# Patient Record
Sex: MALE | Race: WHITE | NOT HISPANIC OR LATINO | Employment: FULL TIME | URBAN - METROPOLITAN AREA
[De-identification: names, ages, dates, MRNs, and addresses within clinical notes are randomized per-mention and may not be internally consistent; named-entity substitution may affect disease eponyms.]

---

## 2017-06-28 ENCOUNTER — DOCUMENTATION ONLY (OUTPATIENT)
Dept: SLEEP MEDICINE | Facility: CLINIC | Age: 47
End: 2017-06-28

## 2017-10-04 DIAGNOSIS — I10 ESSENTIAL HYPERTENSION WITH GOAL BLOOD PRESSURE LESS THAN 140/90: ICD-10-CM

## 2017-10-04 NOTE — TELEPHONE ENCOUNTER
Lisinopril      Last Written Prescription Date: 09/20/2016  Last Fill Quantity: 90, # refills: 3  Last Office Visit with G, P or [facility] prescribing provider: 09/20/2016       Potassium   Date Value Ref Range Status   09/20/2016 4.1 3.4 - 5.3 mmol/L Final     Creatinine   Date Value Ref Range Status   09/20/2016 1.07 0.66 - 1.25 mg/dL Final     BP Readings from Last 3 Encounters:   09/20/16 122/86   06/29/16 122/77   08/17/15 130/84     Bijan HALL (R)

## 2017-10-05 RX ORDER — LISINOPRIL 10 MG/1
10 TABLET ORAL DAILY
Qty: 30 TABLET | Refills: 0 | Status: SHIPPED | OUTPATIENT
Start: 2017-10-05 | End: 2017-11-27

## 2017-11-27 ENCOUNTER — OFFICE VISIT (OUTPATIENT)
Dept: FAMILY MEDICINE | Facility: CLINIC | Age: 47
End: 2017-11-27
Payer: COMMERCIAL

## 2017-11-27 VITALS
BODY MASS INDEX: 30.59 KG/M2 | HEIGHT: 75 IN | TEMPERATURE: 97 F | HEART RATE: 64 BPM | WEIGHT: 246 LBS | SYSTOLIC BLOOD PRESSURE: 133 MMHG | DIASTOLIC BLOOD PRESSURE: 82 MMHG

## 2017-11-27 DIAGNOSIS — I10 ESSENTIAL HYPERTENSION WITH GOAL BLOOD PRESSURE LESS THAN 140/90: ICD-10-CM

## 2017-11-27 PROCEDURE — 99213 OFFICE O/P EST LOW 20 MIN: CPT | Performed by: NURSE PRACTITIONER

## 2017-11-27 RX ORDER — LISINOPRIL 10 MG/1
10 TABLET ORAL DAILY
Qty: 90 TABLET | Refills: 3 | Status: SHIPPED | OUTPATIENT
Start: 2017-11-27 | End: 2018-12-22

## 2017-11-27 NOTE — MR AVS SNAPSHOT
After Visit Summary   11/27/2017    Calista Garnica    MRN: [id number]           Patient Information     Date Of Birth          1970        Visit Information        Provider Department      11/27/2017 11:20 AM Carli Molnia APRN [facility]        Today's Diagnoses     Essential hypertension with goal blood pressure less than 140/90          Care Instructions    Results for CALISTA GARNICA (MRN [id number]) as of 11/27/2017 11:51   Ref. Range 6/29/2015 10:19 8/17/2015 10:22 8/17/2015 10:23 9/20/2016 11:17   Sodium Latest Ref Range: 133 - 144 mmol/L 140   139   Potassium Latest Ref Range: 3.4 - 5.3 mmol/L 4.3   4.1   Chloride Latest Ref Range: 94 - 109 mmol/L 107   105   Carbon Dioxide Latest Ref Range: 20 - 32 mmol/L 27   27   Urea Nitrogen Latest Ref Range: 7 - 30 mg/dL 21   19   Creatinine Latest Ref Range: 0.66 - 1.25 mg/dL 1.14   1.07   GFR Estimate Latest Ref Range: >60 mL/min/1.7m2 69   74   GFR Estimate If Black Latest Ref Range: >60 mL/min/1.7m2 84   89   Calcium Latest Ref Range: 8.5 - 10.1 mg/dL 8.8   9.0   Anion Gap Latest Ref Range: 3 - 14 mmol/L 6   7   Hemoglobin A1C Latest Ref Range: 4.3 - 6.0 %  5.7  5.3   Cholesterol Latest Ref Range: <200 mg/dL 145   180   Cholesterol/HDL Ratio Latest Ref Range: 0.0 - 5.0  5.0      HDL Cholesterol Latest Ref Range: >39 mg/dL 29 (L)   33 (L)   LDL Cholesterol Calculated Latest Ref Range: <100 mg/dL 80   109 (H)   Non HDL Cholesterol Latest Ref Range: <130 mg/dL    147 (H)   Triglycerides Latest Ref Range: <150 mg/dL 178 (H)   189 (H)   VLDL-Cholesterol Latest Ref Range: 0 - 30 mg/dL 36 (H)      Glucose Latest Ref Range: 70 - 99 mg/dL 103 (H) 116 (H)  103 (H)     Please schedule lab appointment to check kidney function, glucose and electrolytes      1. Try to follow low fat, or Mediterranean diet:   -a Mediterranean diet appears to reduce the risk of cardiovascular events. There is no single Mediterranean diet, but such  "diets are typically high in fruits, vegetables, whole grains, beans, nuts, and seeds and include olive oil as an important source of fat; there are typically low to moderate amounts of fish, poultry, and dairy products, and there is little red meat.  2. Exercise daily.   3. Add fiber supplement, certain soluble fibers, such as Metamucil, Psyllium will help to reduce LDL, or \"bad cholesterol\".   4. Eating nuts (Walnuts, hazelnuts and almonds can also help to lower triglycerides and LDL    5. Fish oil and omega 3 fatty acids can reduce triglycerides.               Follow-ups after your visit        Future tests that were ordered for you today     Open Future Orders        Priority Expected Expires Ordered    **Basic metabolic panel FUTURE anytime Routine 11/27/2017 11/27/2018 11/27/2017            Who to contact     If you have questions or need follow up information about today's clinic visit or your schedule please contact [facility] directly at [phone].  Normal or non-critical lab and imaging results will be communicated to you by [employer], letter or phone within 4 business days after the clinic has received the results. If you do not hear from us within 7 days, please contact the clinic through [employer] or phone. If you have a critical or abnormal lab result, we will notify you by phone as soon as possible.  Submit refill requests through [employer] or call your pharmacy and they will forward the refill request to us. Please allow 3 business days for your refill to be completed.          Additional Information About Your Visit        [employer] Information     [employer] lets you send messages to your doctor, view your test results, renew your prescriptions, schedule appointments and more. To sign up, go to [email or web address] . Click on \"Log in\" on the left side of the screen, which will take you to the Welcome page. Then click on \"Sign up Now\" on the right side of the page.     You will be asked to " "enter the access code listed below, as well as some personal information. Please follow the directions to create your username and password.     Your access code is: [email or web address]  Expires: 2018 12:01 PM     Your access code will  in 90 days. If you need help or a new code, please call your [facility] clinic or [phone].        Care EveryWhere ID     This is your Care EveryWhere ID. This could be used by other organizations to access your [facility] medical records  [id number]        Your Vitals Were     Pulse Temperature Height BMI (Body Mass Index)          64 97  F (36.1  C) (Tympanic) 6' 2.5\" (1.892 m) 31.16 kg/m2         Blood Pressure from Last 3 Encounters:   17 133/82   16 122/86   16 122/77    Weight from Last 3 Encounters:   17 246 lb (111.6 kg)   16 240 lb 6.4 oz (109 kg)   16 242 lb (109.8 kg)                 Where to get your medicines      These medications were sent to [facility] [id number] [place] [address] - [address] AT [facility]  [address], [place] [address]     Phone:  [phone]     lisinopril 10 MG tablet          Primary Care Provider Office Phone # Fax #    Evert Zavaleta [phone] [address] [facility] [address]        Equal Access to Services     [facility]: Hadii aad ku hadasho Soomaali, waaxda luqadaha, qaybta kaalmada brayan, osmany idiin hayaan adeeg kharash la'aan . So [facility] [phone].    ATENCIÓN: Si habla español, tiene a lu disposición servicios gratuitos de asistencia lingüística. Llame al [phone].    We comply with applicable federal civil rights laws and [place] laws. We do not discriminate on the basis of race, color, national origin, age, disability, sex, sexual orientation, or gender identity.            Thank you!     Thank you for choosing [facility]  for your care. Our goal is always to provide you with excellent care. " Hearing back from our patients is one way we can continue to improve our services. Please take a few minutes to complete the written survey that you may receive in the mail after your visit with us. Thank you!             Your Updated Medication List - Protect others around you: Learn how to safely use, store and throw away your medicines at www.disposemymeds.org.          This list is accurate as of: 11/27/17 12:01 PM.  Always use your most recent med list.                   Brand Name Dispense Instructions for use Diagnosis    lisinopril 10 MG tablet    PRINIVIL/ZESTRIL    90 tablet    Take 1 tablet (10 mg) by mouth daily (Needs follow-up appointment for this medication)    Essential hypertension with goal blood pressure less than 140/90       order for DME      Elpidio was set up on [employer] Pressure: set 7 Mask of choice: Wisp Size: Large cushion  Follow up appointment is scheduled: April 13, 2015

## 2017-11-27 NOTE — NURSING NOTE
"Chief Complaint   Patient presents with     Refill Request     pending        Initial /82  Pulse 64  Temp 97  F (36.1  C) (Tympanic)  Ht 6' 2.5\" (1.892 m)  Wt 246 lb (111.6 kg)  BMI 31.16 kg/m2 Estimated body mass index is 31.16 kg/(m^2) as calculated from the following:    Height as of this encounter: 6' 2.5\" (1.892 m).    Weight as of this encounter: 246 lb (111.6 kg).  Medication Reconciliation: complete  "

## 2017-11-27 NOTE — PROGRESS NOTES
"  SUBJECTIVE:   Elpidio Swift is a 47 year old male who presents to clinic today for the following health issues:      Hypertension Follow-up      Outpatient blood pressures are being checked at home.  Results are 132/77.    Low Salt Diet: not monitoring salt    Amount of exercise or physical activity: 6-7 days/week for an average of 30-45 minutes    Problems taking medications regularly: No    Medication side effects: none    Diet: regular (no restrictions)    HYPERTENSION - Patient has longstanding history of mod-severe HTN , currently denies any symptoms referable to elevated blood pressure. Specifically denies chest pain, palpitations, dyspnea, orthopnea, PND or peripheral edema. Blood pressure readings have been in fair range. Current medication regimen is as listed below. Patient denies any side effects of medication.      Problem list and histories reviewed & adjusted, as indicated.  Additional history: as documented    Labs reviewed in EPIC    Reviewed and updated as needed this visit by clinical staffTobacco  Allergies  Med Hx  Surg Hx  Fam Hx  Soc Hx      Reviewed and updated as needed this visit by Provider         ROS:  Constitutional, HEENT, cardiovascular, pulmonary, gi and gu systems are negative, except as otherwise noted.      OBJECTIVE:   /82  Pulse 64  Temp 97  F (36.1  C) (Tympanic)  Ht 6' 2.5\" (1.892 m)  Wt 246 lb (111.6 kg)  BMI 31.16 kg/m2  Body mass index is 31.16 kg/(m^2).  GENERAL: healthy, alert and no distress  CV: regular rate and rhythm, normal S1 S2, no S3 or S4, no murmur, click or rub, no peripheral edema and peripheral pulses strong  MS: no gross musculoskeletal defects noted, no edema  PSYCH: mentation appears normal, affect normal/bright    Diagnostic Test Results:  pending    ASSESSMENT/PLAN:     1. Essential hypertension with goal blood pressure less than 140/90  -well controlled, refill provided   - lisinopril (PRINIVIL/ZESTRIL) 10 MG tablet; Take 1 tablet (10 " mg) by mouth daily (Needs follow-up appointment for this medication)  Dispense: 90 tablet; Refill: 3  - Basic metabolic panel FUTURE anytime; Future  - Lipid panel reflex to direct LDL Fasting; Future    See Patient Instructions    AZAR Dasilva [facility]

## 2017-11-27 NOTE — PATIENT INSTRUCTIONS
"Results for CALISTA GARNICA (MRN [id number]) as of 11/27/2017 11:51   Ref. Range 6/29/2015 10:19 8/17/2015 10:22 8/17/2015 10:23 9/20/2016 11:17   Sodium Latest Ref Range: 133 - 144 mmol/L 140   139   Potassium Latest Ref Range: 3.4 - 5.3 mmol/L 4.3   4.1   Chloride Latest Ref Range: 94 - 109 mmol/L 107   105   Carbon Dioxide Latest Ref Range: 20 - 32 mmol/L 27   27   Urea Nitrogen Latest Ref Range: 7 - 30 mg/dL 21   19   Creatinine Latest Ref Range: 0.66 - 1.25 mg/dL 1.14   1.07   GFR Estimate Latest Ref Range: >60 mL/min/1.7m2 69   74   GFR Estimate If Black Latest Ref Range: >60 mL/min/1.7m2 84   89   Calcium Latest Ref Range: 8.5 - 10.1 mg/dL 8.8   9.0   Anion Gap Latest Ref Range: 3 - 14 mmol/L 6   7   Hemoglobin A1C Latest Ref Range: 4.3 - 6.0 %  5.7  5.3   Cholesterol Latest Ref Range: <200 mg/dL 145   180   Cholesterol/HDL Ratio Latest Ref Range: 0.0 - 5.0  5.0      HDL Cholesterol Latest Ref Range: >39 mg/dL 29 (L)   33 (L)   LDL Cholesterol Calculated Latest Ref Range: <100 mg/dL 80   109 (H)   Non HDL Cholesterol Latest Ref Range: <130 mg/dL    147 (H)   Triglycerides Latest Ref Range: <150 mg/dL 178 (H)   189 (H)   VLDL-Cholesterol Latest Ref Range: 0 - 30 mg/dL 36 (H)      Glucose Latest Ref Range: 70 - 99 mg/dL 103 (H) 116 (H)  103 (H)     Please schedule lab appointment to check kidney function, glucose and electrolytes      1. Try to follow low fat, or Mediterranean diet:   -a Mediterranean diet appears to reduce the risk of cardiovascular events. There is no single Mediterranean diet, but such diets are typically high in fruits, vegetables, whole grains, beans, nuts, and seeds and include olive oil as an important source of fat; there are typically low to moderate amounts of fish, poultry, and dairy products, and there is little red meat.  2. Exercise daily.   3. Add fiber supplement, certain soluble fibers, such as Metamucil, Psyllium will help to reduce LDL, or \"bad cholesterol\".   4. Eating nuts " (Walnuts, hazelnuts and almonds can also help to lower triglycerides and LDL    5. Fish oil and omega 3 fatty acids can reduce triglycerides.

## 2018-02-15 ENCOUNTER — TELEPHONE (OUTPATIENT)
Dept: FAMILY MEDICINE | Facility: CLINIC | Age: 48
End: 2018-02-15

## 2018-02-15 NOTE — TELEPHONE ENCOUNTER
Reason for Call:  Other stomach flu vs food poisoning    Detailed comments: Patient is not sure if he had stomach flu or food poisoning.    Phone Number Patient can be reached at: Cell number on file:    Telephone Information:   Mobile [phone]       Best Time: any    Can we leave a detailed message on this number? YES    Call taken on 2/15/2018 at 10:14 AM by Angelica Kumar

## 2018-02-15 NOTE — TELEPHONE ENCOUNTER
"\"diarrhea went away now, but  Sunday night about 10 pm, went to [employer], ate steak and cheese with all the vegetables, and about an hour later I had abd pain, and diarrhea really bad, and then I vomited. 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 \"  \"diarrhea continued Monday, and I slept off and on , and never threw up again and then diarrhea continued into Tuesday evening. I was drinking water and gatorade, and had crackers , and tried to go to work yesterday and my stomach is still rolling, but the diarrhea has gone away. \"  Temp normal, able to drink. Feeling better.   Doing better had questions about how long this might last, was it food or viral gastroenteritis, anything that he needs to do.   Reviewed and when to be seen, he understood and agreed.    FOOD POISONING or VIRAL GASTROENTERITIS (6yr-Adult) from references reviewed with him  Jessica Solis RNC          "

## 2018-03-08 DIAGNOSIS — I10 ESSENTIAL HYPERTENSION WITH GOAL BLOOD PRESSURE LESS THAN 140/90: ICD-10-CM

## 2018-03-08 LAB
ANION GAP SERPL CALCULATED.3IONS-SCNC: 6 MMOL/L (ref 3–14)
BUN SERPL-MCNC: 14 MG/DL (ref 7–30)
CALCIUM SERPL-MCNC: 8.7 MG/DL (ref 8.5–10.1)
CHLORIDE SERPL-SCNC: 103 MMOL/L (ref 94–109)
CHOLEST SERPL-MCNC: 147 MG/DL
CO2 SERPL-SCNC: 28 MMOL/L (ref 20–32)
CREAT SERPL-MCNC: 1.09 MG/DL (ref 0.66–1.25)
GFR SERPL CREATININE-BSD FRML MDRD: 72 ML/MIN/1.7M2
GLUCOSE SERPL-MCNC: 84 MG/DL (ref 70–99)
HDLC SERPL-MCNC: 27 MG/DL
LDLC SERPL CALC-MCNC: 88 MG/DL
NONHDLC SERPL-MCNC: 120 MG/DL
POTASSIUM SERPL-SCNC: 4.5 MMOL/L (ref 3.4–5.3)
SODIUM SERPL-SCNC: 137 MMOL/L (ref 133–144)
TRIGL SERPL-MCNC: 160 MG/DL

## 2018-03-08 PROCEDURE — 80048 BASIC METABOLIC PNL TOTAL CA: CPT | Performed by: NURSE PRACTITIONER

## 2018-03-08 PROCEDURE — 80061 LIPID PANEL: CPT | Performed by: NURSE PRACTITIONER

## 2018-03-08 PROCEDURE — 36415 COLL VENOUS BLD VENIPUNCTURE: CPT | Performed by: NURSE PRACTITIONER

## 2018-06-13 DIAGNOSIS — G47.33 OSA (OBSTRUCTIVE SLEEP APNEA): Primary | Chronic | ICD-10-CM

## 2018-06-27 ENCOUNTER — OFFICE VISIT (OUTPATIENT)
Dept: SLEEP MEDICINE | Facility: CLINIC | Age: 48
End: 2018-06-27
Payer: COMMERCIAL

## 2018-06-27 VITALS
HEIGHT: 75 IN | SYSTOLIC BLOOD PRESSURE: 119 MMHG | DIASTOLIC BLOOD PRESSURE: 78 MMHG | BODY MASS INDEX: 29.22 KG/M2 | OXYGEN SATURATION: 94 % | HEART RATE: 60 BPM | WEIGHT: 235 LBS

## 2018-06-27 DIAGNOSIS — G47.33 OSA (OBSTRUCTIVE SLEEP APNEA): Primary | ICD-10-CM

## 2018-06-27 PROCEDURE — 99214 OFFICE O/P EST MOD 30 MIN: CPT | Performed by: PHYSICIAN ASSISTANT

## 2018-06-27 NOTE — NURSING NOTE
"Chief Complaint   Patient presents with     CPAP Follow Up     Routine follow up for DOT . Needs letter and download       Initial /78  Pulse 60  Ht 1.892 m (6' 2.5\")  Wt 106.6 kg (235 lb)  SpO2 94%  BMI 29.77 kg/m2 Estimated body mass index is 29.77 kg/(m^2) as calculated from the following:    Height as of this encounter: 1.892 m (6' 2.5\").    Weight as of this encounter: 106.6 kg (235 lb).    Medication Reconciliation: complete      "

## 2018-06-27 NOTE — PROGRESS NOTES
Obstructive Sleep Apnea - PAP Follow-Up Visit:    Chief Complaint   Patient presents with     CPAP Follow Up     Routine follow up for DOT . Needs letter and download       Elpidio Swift comes in today for CPAP compliance follow-up of severe ROSALINA (2/26/2015 with AHI 85.2, xin desat 75%, CPAP 7-8 optimal) treated with CPAP 7 cm H2O.  Presenting concerns of snoring, observed apnea, HTN, DOT CDL renewal.    Overall, the patient rates his experience with PAP as 10 (0 poor, 10 great). The mask is comfortable. The mask is not leaking.  He is not snoring with the mask on. He is not having gasp arousals. He is not having any oral or nasal dryness. The pressure settings are comfortable.    His PAP interface is Nasal Pillows.    Bedtime is typically 2 AM. Usually it takes about 10 AM minutes to fall asleep with the mask on. Wake time is typically 11 AM.  Patient is using PAP therapy 7 hours per night. The patient is usually getting 7 hours of sleep per night.    He does feel rested in the morning.    [place] Sleepiness Scale: 0/24    Respironics  CPAP 7 cmH2O 30 day usage data:  86% of days with > 4 hours of use. 1/30 days with no use. Average use 393 minutes per day.   Average leak 22.56 LPM.  Average % of night in large leak 0%.    AHI 0.87 events per hour.     He has no concerns today.     Past medical/surgical history, family history, social history, medications and allergies were reviewed.      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  Rash responds well to triamcinolone  "cream.       HYPERLIPIDEMIA LDL GOAL <160 10/31/2010     Priority: Medium     Hyperlipidemia 01/23/2007     Priority: Medium     Problem list name updated by automated process. Provider to review          /78  Pulse 60  Ht 1.892 m (6' 2.5\")  Wt 106.6 kg (235 lb)  SpO2 94%  BMI 29.77 kg/m2    Impression/Plan:  Severe Sleep apnea-  Tolerating PAP well. Daytime symptoms are improved.   Comprehensive DME.   His machine is making loud noise and he will see Elenita about that. His CPAP is 2.5 years old.      Elpidio Swift will follow up in about 2 year, sooner if concerns.     Twenty-five minutes spent with patient, all of which were spent face-to-face counseling, consulting, coordinating plan of care regarding ROSALINA.      Eduardo Lester PA-C    "

## 2018-06-27 NOTE — MR AVS SNAPSHOT
After Visit Summary   6/27/2018    Elpidio Swift    MRN: [id number]           Patient Information     Date Of Birth          1970        Visit Information        Provider Department      6/27/2018 10:30 AM Eduardo Lester PA [facility]        Today's Diagnoses     ROSALINA (obstructive sleep apnea)    -  1      Care Instructions      Your BMI is Body mass index is 29.77 kg/(m^2).  Weight management is a personal decision.  If you are interested in exploring weight loss strategies, the following discussion covers the approaches that may be successful. Body mass index (BMI) is one way to tell whether you are at a healthy weight, overweight, or obese. It measures your weight in relation to your height.  A BMI of 18.5 to 24.9 is in the healthy range. A person with a BMI of 25 to 29.9 is considered overweight, and someone with a BMI of 30 or greater is considered obese. More than two-thirds of American adults are considered overweight or obese.  Being overweight or obese increases the risk for further weight gain. Excess weight may lead to heart disease and diabetes.  Creating and following plans for healthy eating and physical activity may help you improve your health.  Weight control is part of healthy lifestyle and includes exercise, emotional health, and healthy eating habits. Careful eating habits lifelong are the mainstay of weight control. Though there are significant health benefits from weight loss, long-term weight loss with diet alone may be very difficult to achieve- studies show long-term success with dietary management in less than 10% of people. Attaining a healthy weight may be especially difficult to achieve in those with severe obesity. In some cases, medications, devices and surgical management might be considered.  What can you do?  If you are overweight or obese and are interested in methods for weight loss, you should discuss this with your provider.     Consider  reducing daily calorie intake by 500 calories.     Keep a food journal.     Avoiding skipping meals, consider cutting portions instead.    Diet combined with exercise helps maintain muscle while optimizing fat loss. Strength training is particularly important for building and maintaining muscle mass. Exercise helps reduce stress, increase energy, and improves fitness. Increasing exercise without diet control, however, may not burn enough calories to loose weight.       Start walking three days a week 10-20 minutes at a time    Work towards walking thirty minutes five days a week     Eventually, increase the speed of your walking for 1-2 minutes at time    In addition, we recommend that you review healthy lifestyles and methods for weight loss available through the National Institutes of Health patient information sites:  http://win.niddk.nih.gov/publications/index.htm    And look into health and wellness programs that may be available through your health insurance provider, employer, local community center, or crystal club.    Weight management plan: Patient was referred to their PCP to discuss a diet and exercise plan.        Your Body mass index is 29.77 kg/(m^2).  Weight management is a personal decision.  If you are interested in exploring weight loss strategies, the following discussion covers the approaches that may be successful. Body mass index (BMI) is one way to tell whether you are at a healthy weight, overweight, or obese. It measures your weight in relation to your height.  A BMI of 18.5 to 24.9 is in the healthy range. A person with a BMI of 25 to 29.9 is considered overweight, and someone with a BMI of 30 or greater is considered obese. More than two-thirds of American adults are considered overweight or obese.  Being overweight or obese increases the risk for further weight gain. Excess weight may lead to heart disease and diabetes.  Creating and following plans for healthy eating and physical activity  may help you improve your health.  Weight control is part of healthy lifestyle and includes exercise, emotional health, and healthy eating habits. Careful eating habits lifelong are the mainstay of weight control. Though there are significant health benefits from weight loss, long-term weight loss with diet alone may be very difficult to achieve- studies show long-term success with dietary management in less than 10% of people. Attaining a healthy weight may be especially difficult to achieve in those with severe obesity. In some cases, medications, devices and surgical management might be considered.  What can you do?  If you are overweight or obese and are interested in methods for weight loss, you should discuss this with your provider.     Consider reducing daily calorie intake by 500 calories.     Keep a food journal.     Avoiding skipping meals, consider cutting portions instead.    Diet combined with exercise helps maintain muscle while optimizing fat loss. Strength training is particularly important for building and maintaining muscle mass. Exercise helps reduce stress, increase energy, and improves fitness. Increasing exercise without diet control, however, may not burn enough calories to loose weight.       Start walking three days a week 10-20 minutes at a time    Work towards walking thirty minutes five days a week     Eventually, increase the speed of your walking for 1-2 minutes at time    In addition, we recommend that you review healthy lifestyles and methods for weight loss available through the National Institutes of Health patient information sites:  http://win.niddk.nih.gov/publications/index.htm    And look into health and wellness programs that may be available through your health insurance provider, employer, local community center, or crystal club.    Weight management plan: Patient was referred to their PCP to discuss a diet and exercise plan.          Follow-ups after your visit       "  Follow-up notes from your care team     Return in 2 years (on 6/27/2020) for PAP follow up.      Who to contact     If you have questions or need follow up information about today's clinic visit or your schedule please contact [facility] directly at [phone].  Normal or non-critical lab and imaging results will be communicated to you by MyChart, letter or phone within 4 business days after the clinic has received the results. If you do not hear from us within 7 days, please contact the clinic through MyChart or phone. If you have a critical or abnormal lab result, we will notify you by phone as soon as possible.  Submit refill requests through [employer]xus or call your pharmacy and they will forward the refill request to us. Please allow 3 business days for your refill to be completed.          Additional Information About Your Visit        Care EveryWhere ID     This is your Care EveryWhere ID. This could be used by other organizations to access your [facility] medical records  [id number]        Your Vitals Were     Pulse Height Pulse Oximetry BMI (Body Mass Index)          60 1.892 m (6' 2.5\") 94% 29.77 kg/m2         Blood Pressure from Last 3 Encounters:   06/27/18 119/78   11/27/17 133/82   09/20/16 122/86    Weight from Last 3 Encounters:   06/27/18 106.6 kg (235 lb)   11/27/17 111.6 kg (246 lb)   09/20/16 109 kg (240 lb 6.4 oz)              We Performed the Following     Sleep Comprehensive DME        Primary Care Provider Office Phone # Fax #    Evert Zavaleta [phone] [address] [facility] [address]        Equal Access to Services     [facility]: Hadii luis Taylor, watrang thorpe, qaybpeggy alvarezalosmany aggarwal. So [place] [phone].    ATENCIÓN: Si habla español, tiene a lu disposición servicios gratuitos de asistencia lingüística. Geraldo san [phone].    We comply with applicable federal civil " rights laws and [place] laws. We do not discriminate on the basis of race, color, national origin, age, disability, sex, sexual orientation, or gender identity.            Thank you!     Thank you for choosing [facility]  for your care. Our goal is always to provide you with excellent care. Hearing back from our patients is one way we can continue to improve our services. Please take a few minutes to complete the written survey that you may receive in the mail after your visit with us. Thank you!             Your Updated Medication List - Protect others around you: Learn how to safely use, store and throw away your medicines at www.disposemymeds.org.          This list is accurate as of 6/27/18 11:52 AM.  Always use your most recent med list.                   Brand Name Dispense Instructions for use Diagnosis    lisinopril 10 MG tablet    PRINIVIL/ZESTRIL    90 tablet    Take 1 tablet (10 mg) by mouth daily (Needs follow-up appointment for this medication)    Essential hypertension with goal blood pressure less than 140/90       order for DME      Elpidio was set up on [employer] remstar Pressure: set 7 Mask of choice: Wisp Size: Large cushion  Follow up appointment is scheduled: April 13, 2015

## 2018-06-27 NOTE — PATIENT INSTRUCTIONS

## 2018-11-11 ENCOUNTER — HOSPITAL ENCOUNTER (EMERGENCY)
Facility: CLINIC | Age: 48
Discharge: HOME | End: 2018-11-11
Attending: NURSE PRACTITIONER | Admitting: NURSE PRACTITIONER
Payer: COMMERCIAL

## 2018-11-11 VITALS
SYSTOLIC BLOOD PRESSURE: 167 MMHG | RESPIRATION RATE: 14 BRPM | TEMPERATURE: 98 F | BODY MASS INDEX: 30.15 KG/M2 | DIASTOLIC BLOOD PRESSURE: 102 MMHG | OXYGEN SATURATION: 99 % | WEIGHT: 238 LBS | HEART RATE: 65 BPM

## 2018-11-11 DIAGNOSIS — S61.411A LACERATION OF RIGHT HAND WITHOUT FOREIGN BODY, INITIAL ENCOUNTER: ICD-10-CM

## 2018-11-11 PROCEDURE — 99213 OFFICE O/P EST LOW 20 MIN: CPT | Mod: 25 | Performed by: NURSE PRACTITIONER

## 2018-11-11 PROCEDURE — G0463 HOSPITAL OUTPT CLINIC VISIT: HCPCS | Performed by: NURSE PRACTITIONER

## 2018-11-11 PROCEDURE — 12001 RPR S/N/AX/GEN/TRNK 2.5CM/<: CPT | Mod: Z6 | Performed by: NURSE PRACTITIONER

## 2018-11-11 PROCEDURE — 12001 RPR S/N/AX/GEN/TRNK 2.5CM/<: CPT | Performed by: NURSE PRACTITIONER

## 2018-11-11 NOTE — ED AVS SNAPSHOT
[facility]    [address] [facility] [address]    Phone:  [phone]    Fax:  [phone]                                       Elpidio Swift   MRN: [id number]    Department:  [facility]   Date of Visit:  11/11/2018           Patient Information     Date Of Birth          1970        Your diagnoses for this visit were:     Laceration of right hand without foreign body, initial encounter between finger two and finger three dorsal side       You were seen by Wanda Romo APRN CNP.      Follow-up Information     Follow up with [facility] In 6 days.    Specialty:  EMERGENCY MEDICINE    Why:  For suture removal    Contact information:    [address] [place] [id number] [phone]    Additional information:    The medical center is located at   [address] [facility] (between [facility] and   [facility] [address] in [place], [place] miles north   of [place]).        Discharge Instructions         Extremity Laceration: Stitches, Staples, or Tape  A laceration is a cut through the skin. If it is deep, it may require stitches or staples to close so it can heal. Minor cuts may be treated with surgical tape closures, or skin glue.  X-rays may be done if something may have entered the skin through the cut. You may also need a tetanus shot if you are not up to date on this vaccine.  Home care    Follow the healthcare provider s instructions on how to care for the cut.    Wash your hands with soap and warm water before and after caring for your wound. This is to help prevent infection.    Keep the wound clean and dry. If a bandage was applied and it becomes wet or dirty, replace it. Otherwise, leave it in place for the first 24 hours, then change it once a day or as directed.    If stitches or staples were used, clean the wound daily:  ? After removing the bandage, wash the area with soap and water. Use a wet cotton swab to  loosen and remove any blood or crust that forms.  ? After cleaning, keep the wound clean and dry. Talk with your healthcare provider before putting any antibiotic ointment on the wound. Reapply the bandage.    You may remove the bandage to shower as usual after the first 24 hours, but don't soak the area in water (no swimming) until the stitches or staples are removed.    If surgical tape closures were used, keep the area clean and dry. If it becomes wet, blot it dry with a towel. Let the surgical tape fall off on its own.    The healthcare provider may prescribe an antibiotic cream or ointment to prevent infection. He or she may also prescribe an antibiotic pill. Don't stop taking this medicine until you have finished it all or the provider tells you to stop.    The provider may also prescribe medicine for pain. Follow the instructions for taking these medicines.    Don't do activities that may reopen your wound.  Follow-up care  Follow up with your healthcare provider, or as advised. Most skin wounds heal within 10 days. But an infection may sometimes occur even with proper treatment. Check the wound daily for the signs of infection listed below. Stitches and staples should be removed within 7 to14 days. If surgical tape closures were used, you may remove them after 10 days if they have not fallen off by then.   When to seek medical advice  Call your healthcare provider right away if any of these occur:    Wound bleeding not controlled by direct pressure    Signs of infection, including increasing pain in the wound, increasing wound redness or swelling, or pus or bad odor coming from the wound    Fever of 100.4 F (38 C) or higher, or as directed by your healthcare provider    Stitches or staples come apart or fall out or surgical tape falls off before 7 days    Wound edges reopen    Wound changes colors    Numbness occurs around the wound     Decreased movement around the injured area  Date Last Reviewed:  7/1/2017 2000-2018 [employer] [address] [place] [address], [place] [address]. All rights reserved. This information is not intended as a substitute for professional medical care. Always follow your healthcare professional's instructions.          24 Hour Appointment Hotline       To make an appointment at any [facility] clinic, call [phone] ([phone]). If you don't have a family doctor or clinic, we will help you find one. [facility] clinics are conveniently located to serve the needs of you and your family.             Review of your medicines      Our records show that you are taking the medicines listed below. If these are incorrect, please call your family doctor or clinic.        Dose / Directions Last dose taken    lisinopril 10 MG tablet   Commonly known as:  PRINIVIL/ZESTRIL   Dose:  10 mg   Quantity:  90 tablet        Take 1 tablet (10 mg) by mouth daily (Needs follow-up appointment for this medication)   Refills:  3        order for DME        Elpidio was set up on [employer] remstar Pressure: set 7 Mask of choice: Wisp Size: Large cushion  Follow up appointment is scheduled: April 13, 2015   Refills:  0                Orders Needing Specimen Collection     None      Pending Results     No orders found from 11/9/2018 to 11/12/2018.            Pending Culture Results     No orders found from 11/9/2018 to 11/12/2018.            Pending Results Instructions     If you had any lab results that were not finalized at the time of your Discharge, you can call the ED Lab Result RN at [phone]. You will be contacted by this team for any positive Lab results or changes in treatment. The nurses are available 7 days a week from 10A to 6:30P.  You can leave a message 24 hours per day and they will return your call.        Test Results From Your Hospital Stay               Thank you for choosing [facility]       Thank you for choosing [facility] for your care. Our goal is always to provide you with  "excellent care. Hearing back from our patients is one way we can continue to improve our services. Please take a few minutes to complete the written survey that you may receive in the mail after you visit with us. Thank you!        Trivie Information     Trivie lets you send messages to your doctor, view your test results, renew your prescriptions, schedule appointments and more. To sign up, go to [email or web address] . Click on \"Log in\" on the left side of the screen, which will take you to the Welcome page. Then click on \"Sign up Now\" on the right side of the page.     You will be asked to enter the access code listed below, as well as some personal information. Please follow the directions to create your username and password.     Your access code is: [email or web address]  Expires: 2019  5:12 PM     Your access code will  in 90 days. If you need help or a new code, please call your [facility] clinic or [phone].        Care EveryWhere ID     This is your Care EveryWhere ID. This could be used by other organizations to access your [facility] medical records  [id number]        Equal Access to Services     LESLEE FERRO : Hadii luis Taylor, marija thorpe, melissa schmidt, osmany clements . So [facility] [place] [facility] [phone].    ATENCIÓN: Si habla español, tiene a lu disposición servicios gratuitos de asistencia lingüística. Llame al [phone].    We comply with applicable federal civil rights laws and [place] laws. We do not discriminate on the basis of race, color, national origin, age, disability, sex, sexual orientation, or gender identity.            After Visit Summary       This is your record. Keep this with you and show to your community pharmacist(s) and doctor(s) at your next visit.                  "

## 2018-11-11 NOTE — ED PROVIDER NOTES
History     Chief Complaint   Patient presents with     Laceration     right hand between digits 2-3 with deer antler.      HPI  Elpidio Swift is a 48 year old male who admits to past medical history of prediabetes, hypertension, sleep apnea, and hyperlipidemia presenting to urgent care with a laceration.  Patient states that he was out hunting and obtained a a mail antler deer.  Patient reports that as he was lifting the deer into the back of his pickup truck the antler caught between his second and third finger on the top of his right hand sustaining a laceration.  Patient denies any change in range of motion, sensation or movement of his hand.  He reports mild to absent pain presently.  Patient states that he washed the wound out well and then applied a tape.  Patient reports that the injury occurred approximately 4+ hours ago.  Patient states he feels fine otherwise and denies any other concerns today.  Last tetanus was 2012.  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  Rash responds well to triamcinolone cream.       HYPERLIPIDEMIA LDL GOAL <160 10/31/2010     Priority: Medium     Hyperlipidemia 01/23/2007     Priority: Medium     Problem list name updated by automated process. Provider to review          Past Medical History:    No past medical history on file.    Past Surgical History:    No past surgical history on file.    Family History:    Family History   Problem Relation Age of Onset     Diabetes Mother 83     Unknown/Adopted Maternal Grandfather       Unknown/Adopted Paternal Grandmother      Cerebrovascular Disease Paternal Grandfather      Psychotic Disorder Brother      depression     Psychotic Disorder Daughter      ADHD     Psychotic Disorder Brother      depression and possible schizophrenia     Psychotic Disorder Brother      depression     Cardiac Sudden Death Brother       at age 57- cardiac arrest     Asthma Brother        Social History:  Marital Status:  Single [1]  Social History   Substance Use Topics     Smoking status: Never Smoker     Smokeless tobacco: Never Used     Alcohol use 0.0 oz/week     0 Standard drinks or equivalent per week      Comment: few per week        Medications:      lisinopril (PRINIVIL/ZESTRIL) 10 MG tablet   ORDER FOR DME       Review of Systems   Constitutional: Negative for chills, diaphoresis and fever.   HENT: Negative for congestion, ear pain and sore throat.    Eyes: Negative for pain and visual disturbance.   Respiratory: Negative for cough, shortness of breath and wheezing.    Gastrointestinal: Negative for abdominal pain, constipation, diarrhea and vomiting.   Genitourinary: Negative for difficulty urinating and dysuria.   Skin: Positive for wound (top of right hand between finger 2-3).   Neurological: Negative for speech difficulty and headaches.   Psychiatric/Behavioral: Negative for confusion.   All other systems reviewed and are negative.      Physical Exam   BP: (!) 167/102  Pulse: 65  Temp: 98  F (36.7  C)  Resp: 14  Weight: 108 kg (238 lb)  SpO2: 99 %      Physical Exam   Constitutional: He appears well-developed and well-nourished. No distress.   HENT:   Head: Normocephalic and atraumatic.   Cardiovascular: Normal rate, regular rhythm and normal heart sounds.  Exam reveals no gallop and no friction rub.    No murmur heard.  Pulmonary/Chest: Effort normal and breath sounds normal. No respiratory distress. He has no wheezes. He has no rales. He exhibits no tenderness.   Neurological: He is alert.   Skin:  Skin is warm. Laceration (noted on dorsum of right hand between digits 2-3, 1.5 cm simple laceration with normal neurovascular status) noted. He is not diaphoretic.        Psychiatric: He has a normal mood and affect.   Nursing note and vitals reviewed.      ED Course     ED Course     Procedures  [facility] Procedure Note        Laceration Repair:    Performed by: Wanda Romo  Authorized by: Wanda Romo  Consent given by: Patient who states understanding of the procedure being performed after discussing the risks, benefits and alternatives.    Preparation: Patient was prepped and draped in usual sterile fashion.  Irrigation solution: saline    Body area:right dorsal hand between digit 2-3  Laceration length: 1.5 cm  Contamination: The wound is not contaminated.  Foreign bodies:none  Tendon involvement: none  Anesthesia: Local  Local anesthetic: Lidocaine     1%  Anesthetic total: 2ml    Debridement: with saline and hibiclens  Skin closure: Closed with 2 x 4.0 Ethilon  Technique: interrupted  Approximation: close  Approximation difficulty: simple    Patient tolerance: Patient tolerated the procedure well with no immediate complications.    No results found for this or any previous visit (from the past 24 hour(s)).    Medications - No data to display    Assessments & Plan (with Medical Decision Making)     I have reviewed the nursing notes.    I have reviewed the findings, diagnosis, plan and need for follow up with the patient.  Elpidio Swift is a 48 year old male who admits to past medical history of prediabetes, hypertension, sleep apnea, and hyperlipidemia presenting to urgent care with a laceration.  Patient states that he was out hunting and obtained a a mail antler deer.  Patient reports that as he was lifting the deer into the back of his pickup truck the antler caught between his second and third finger on the top of his right hand sustaining a laceration.  Patient denies any change in range  of motion, sensation or movement of his hand.  He reports mild to absent pain presently.  Patient states that he washed the wound out well and then applied a tape.  Patient reports that the injury occurred approximately 4+ hours ago.  Patient states he feels fine otherwise and denies any other concerns today.  Exam as noted above.  Laceration repaired with sutures without complications.  Did discuss options of wound adhesive versus Steri-Strips and patient agrees with sutures for ease of healing and minimal risk of opening the wound with sutures versus wound adhesive versus Steri-Strips.  Tetanus was updated in 2012 and therefore not indicated today.  Patient discharged in stable condition.  Discharge Medication List as of 11/11/2018  5:12 PM          Final diagnoses:   Laceration of right hand without foreign body, initial encounter - between finger two and finger three dorsal side       11/11/2018   [facility] EMERGENCY DEPARTMENT     Wanda Romo, AZAR CNP  11/12/18 1138

## 2018-11-11 NOTE — DISCHARGE INSTRUCTIONS
Extremity Laceration: Stitches, Staples, or Tape  A laceration is a cut through the skin. If it is deep, it may require stitches or staples to close so it can heal. Minor cuts may be treated with surgical tape closures, or skin glue.  X-rays may be done if something may have entered the skin through the cut. You may also need a tetanus shot if you are not up to date on this vaccine.  Home care    Follow the healthcare provider s instructions on how to care for the cut.    Wash your hands with soap and warm water before and after caring for your wound. This is to help prevent infection.    Keep the wound clean and dry. If a bandage was applied and it becomes wet or dirty, replace it. Otherwise, leave it in place for the first 24 hours, then change it once a day or as directed.    If stitches or staples were used, clean the wound daily:  ? After removing the bandage, wash the area with soap and water. Use a wet cotton swab to loosen and remove any blood or crust that forms.  ? After cleaning, keep the wound clean and dry. Talk with your healthcare provider before putting any antibiotic ointment on the wound. Reapply the bandage.    You may remove the bandage to shower as usual after the first 24 hours, but don't soak the area in water (no swimming) until the stitches or staples are removed.    If surgical tape closures were used, keep the area clean and dry. If it becomes wet, blot it dry with a towel. Let the surgical tape fall off on its own.    The healthcare provider may prescribe an antibiotic cream or ointment to prevent infection. He or she may also prescribe an antibiotic pill. Don't stop taking this medicine until you have finished it all or the provider tells you to stop.    The provider may also prescribe medicine for pain. Follow the instructions for taking these medicines.    Don't do activities that may reopen your wound.  Follow-up care  Follow up with your healthcare provider, or as advised. Most  skin wounds heal within 10 days. But an infection may sometimes occur even with proper treatment. Check the wound daily for the signs of infection listed below. Stitches and staples should be removed within 7 to14 days. If surgical tape closures were used, you may remove them after 10 days if they have not fallen off by then.   When to seek medical advice  Call your healthcare provider right away if any of these occur:    Wound bleeding not controlled by direct pressure    Signs of infection, including increasing pain in the wound, increasing wound redness or swelling, or pus or bad odor coming from the wound    Fever of 100.4 F (38 C) or higher, or as directed by your healthcare provider    Stitches or staples come apart or fall out or surgical tape falls off before 7 days    Wound edges reopen    Wound changes colors    Numbness occurs around the wound     Decreased movement around the injured area  Date Last Reviewed: 7/1/2017 2000-2018 [employer]. [address] [place] [address], [place] [address]. All rights reserved. This information is not intended as a substitute for professional medical care. Always follow your healthcare professional's instructions.

## 2018-11-11 NOTE — ED AVS SNAPSHOT
[facility] Emergency Department    [address] [facility] [address]    Phone:  [phone]    Fax:  [phone]                                       Elpidio Swift   MRN: [id number]    Department:  [facility] Emergency Department   Date of Visit:  11/11/2018           After Visit Summary Signature Page     I have received my discharge instructions, and my questions have been answered. I have discussed any challenges I see with this plan with the nurse or doctor.    ..........................................................................................................................................  Patient/Patient Representative Signature      ..........................................................................................................................................  Patient Representative Print Name and Relationship to Patient    ..................................................               ................................................  Date                                   Time    ..........................................................................................................................................  Reviewed by Signature/Title    ...................................................              ..............................................  Date                                               Time          22EPIC Rev 08/18

## 2018-11-12 ASSESSMENT — ENCOUNTER SYMPTOMS
EYE PAIN: 0
DIFFICULTY URINATING: 0
CONSTIPATION: 0
VOMITING: 0
SHORTNESS OF BREATH: 0
ABDOMINAL PAIN: 0
HEADACHES: 0
SORE THROAT: 0
FEVER: 0
SPEECH DIFFICULTY: 0
WOUND: 1
DIARRHEA: 0
WHEEZING: 0
CONFUSION: 0
DIAPHORESIS: 0
CHILLS: 0
COUGH: 0
DYSURIA: 0

## 2018-11-16 ENCOUNTER — TELEPHONE (OUTPATIENT)
Dept: FAMILY MEDICINE | Facility: CLINIC | Age: 48
End: 2018-11-16

## 2018-11-20 ENCOUNTER — ALLIED HEALTH/NURSE VISIT (OUTPATIENT)
Dept: FAMILY MEDICINE | Facility: CLINIC | Age: 48
End: 2018-11-20
Payer: COMMERCIAL

## 2018-11-20 DIAGNOSIS — Z48.02 ENCOUNTER FOR REMOVAL OF SUTURES: Primary | ICD-10-CM

## 2018-11-20 PROCEDURE — 99207 ZZC NO CHARGE NURSE ONLY: CPT

## 2018-11-20 NOTE — NURSING NOTE
Patient had stitches placed in ED on 11/11/18 on right dorsal hand between digit 2-3  Affected area is clean, dry, edges well approximated.  No redness, no drainage, no swelling is present.  Patient reports area is free of pain  Removed 2,  4.0 Ethilon stitches with ease  Laceration opened with small gape after removal of stitches, Dr. Zavaleta notified  Dr. Zavaleta advised the gaping is natural given the placement of the wound near the joints.  Apply band aid.  Advise patient to apply vasaline over laceration after cleaning and drying the area, then place a band aid over the laceration.  Keep area clean and dry to aid in healing.  Patient advised of Dr. Zavaleta's recommendations.  Reviewed signs/symptoms of infection with patient and advised if these symptoms present, be evaluated by a doctor right away.  Patient verbalized understanding and agreed with instructions for caring for laceration and following up if signs/symptoms of infection present.    Covered laceration with band aid, patient discharged to home.    Cathleen PERRY Rn

## 2018-11-20 NOTE — MR AVS SNAPSHOT
"              After Visit Summary   2018    Elpidio Swift    MRN: [id number]           Patient Information     Date Of Birth          1970        Visit Information        Provider Department      2018 2:00 PM FAWN HERNÁNDEZ/NUNU SHORT [facility]        Today's Diagnoses     Encounter for removal of sutures    -  1       Follow-ups after your visit        Who to contact     If you have questions or need follow up information about today's clinic visit or your schedule please contact [facility] directly at [phone].  Normal or non-critical lab and imaging results will be communicated to you by [employer], letter or phone within 4 business days after the clinic has received the results. If you do not hear from us within 7 days, please contact the clinic through [employer] or phone. If you have a critical or abnormal lab result, we will notify you by phone as soon as possible.  Submit refill requests through [employer] or call your pharmacy and they will forward the refill request to us. Please allow 3 business days for your refill to be completed.          Additional Information About Your Visit        MyChart Information     [employer] lets you send messages to your doctor, view your test results, renew your prescriptions, schedule appointments and more. To sign up, go to [email or web address] . Click on \"Log in\" on the left side of the screen, which will take you to the Welcome page. Then click on \"Sign up Now\" on the right side of the page.     You will be asked to enter the access code listed below, as well as some personal information. Please follow the directions to create your username and password.     Your access code is: [email or web address]  Expires: 2019  5:12 PM     Your access code will  in 90 days. If you need help or a new code, please call your [facility] or [phone].        Care EveryWhere ID     This is your Care EveryWhere ID. This could be used by other " organizations to access your [place] medical records  [id number]         Blood Pressure from Last 3 Encounters:   11/11/18 (!) 167/102   06/27/18 119/78   11/27/17 133/82    Weight from Last 3 Encounters:   11/11/18 238 lb (108 kg)   06/27/18 235 lb (106.6 kg)   11/27/17 246 lb (111.6 kg)              Today, you had the following     No orders found for display       Primary Care Provider Office Phone # Fax #    Evert Zavaleta [phone] [address] [facility] [address]        Equal Access to Services     ROSEMARY FERRO : Hadii luis Taylor, watrang thorpe, qaybpeggy kaalmada brayan, osmany clements . So [facility] [phone].    ATENCIÓN: Si habla español, tiene a lu disposición servicios gratuitos de asistencia lingüística. [facility] [phone].    We comply with applicable federal civil rights laws and [place] laws. We do not discriminate on the basis of race, color, national origin, age, disability, sex, sexual orientation, or gender identity.            Thank you!     Thank you for choosing [facility]  for your care. Our goal is always to provide you with excellent care. Hearing back from our patients is one way we can continue to improve our services. Please take a few minutes to complete the written survey that you may receive in the mail after your visit with us. Thank you!             Your Updated Medication List - Protect others around you: Learn how to safely use, store and throw away your medicines at www.disposemymeds.org.          This list is accurate as of 11/20/18  2:54 PM.  Always use your most recent med list.                   Brand Name Dispense Instructions for use Diagnosis    lisinopril 10 MG tablet    PRINIVIL/ZESTRIL    90 tablet    Take 1 tablet (10 mg) by mouth daily (Needs follow-up appointment for this medication)    Essential hypertension with goal blood pressure less than 140/90       order for DME       Elpidio was set up on [employer] Pressure: set 7 Mask of choice: Wisp Size: Large cushion  Follow up appointment is scheduled: April 13, 2015

## 2018-12-22 DIAGNOSIS — E78.5 HYPERLIPIDEMIA LDL GOAL <160: ICD-10-CM

## 2018-12-22 DIAGNOSIS — E78.5 HYPERLIPIDEMIA: Primary | Chronic | ICD-10-CM

## 2018-12-22 DIAGNOSIS — I10 ESSENTIAL HYPERTENSION WITH GOAL BLOOD PRESSURE LESS THAN 140/90: ICD-10-CM

## 2018-12-22 NOTE — LETTER
[facility]  [address]  [facility] [address]  Phone: [phone]       December 26, 2018         Elpidio Swift  [address] [place] [id number]            Dear Elpidio:    We are concerned about your health care.  We recently provided you with medication refills.  Many medications require routine follow-up with your doctor and routine labs.    At this time, you are due for a clinic visit and fasting labs. Please contact the clinic.     Your prescription(s) have been refilled for 30 days so you may have time for the above noted follow-up. Please call to schedule soon so we can assure you have an appointment before your next refills are needed.    Thank you,      Carli Molina NP  / sri

## 2018-12-24 NOTE — TELEPHONE ENCOUNTER
"Requested Prescriptions   Pending Prescriptions Disp Refills     lisinopril (PRINIVIL/ZESTRIL) 10 MG tablet [Pharmacy Med Name: LISINOPRIL 10MG TABLETS]  Last Written Prescription Date:  11/27/17  Last Fill Quantity: 90,  # refills: 3   Last office visit: 11/20/2018 with prescribing provider:  Jesse   Future Office Visit:     90 tablet 0     Sig: TAKE 1 TABLET(10 MG) BY MOUTH DAILY. FOLLOW-UP APPOINTMENT FOR THIS MEDICATION    ACE Inhibitors (Including Combos) Protocol Failed - 12/22/2018  2:16 PM       Failed - Blood pressure under 140/90 in past 12 months    BP Readings from Last 3 Encounters:   11/11/18 (!) 167/102   06/27/18 119/78   11/27/17 133/82                Failed - Recent (12 mo) or future (30 days) visit within the authorizing provider's specialty    Patient had office visit in the last 12 months or has a visit in the next 30 days with authorizing provider or within the authorizing provider's specialty.  See \"Patient Info\" tab in inbasket, or \"Choose Columns\" in Meds & Orders section of the refill encounter.             Passed - Patient is age 18 or older       Passed - Normal serum creatinine on file in past 12 months    Recent Labs   Lab Test 03/08/18  1252   CR 1.09            Passed - Normal serum potassium on file in past 12 months    Recent Labs   Lab Test 03/08/18  1252   POTASSIUM 4.5               "

## 2018-12-24 NOTE — TELEPHONE ENCOUNTER
Needs OV.    Left message on answering machine for patient to call back.    Thank you    Juanita FORDE RN

## 2018-12-26 RX ORDER — LISINOPRIL 10 MG/1
TABLET ORAL
Qty: 30 TABLET | Refills: 0 | Status: SHIPPED | OUTPATIENT
Start: 2018-12-26 | End: 2019-02-01

## 2018-12-26 NOTE — TELEPHONE ENCOUNTER
Due for clinic visit and fasting labs. 30 day sydney fill given. Reminder letter sent.      Radha AUGUST RN

## 2019-01-31 ENCOUNTER — TELEPHONE (OUTPATIENT)
Dept: FAMILY MEDICINE | Facility: CLINIC | Age: 49
End: 2019-01-31

## 2019-01-31 DIAGNOSIS — I10 ESSENTIAL HYPERTENSION WITH GOAL BLOOD PRESSURE LESS THAN 140/90: ICD-10-CM

## 2019-01-31 RX ORDER — LISINOPRIL 10 MG/1
TABLET ORAL
Qty: 30 TABLET | Refills: 0 | OUTPATIENT
Start: 2019-01-31

## 2019-01-31 NOTE — TELEPHONE ENCOUNTER
Routing refill request to provider for review/approval because:  Mayra given x1 and patient did not follow up, please advise  Labs out of range:  BP  Patient needs to be seen because it has been more than 1 year since last office visit.

## 2019-01-31 NOTE — TELEPHONE ENCOUNTER
Message left for patient to return call to clinic.  CSS - ok to deliver message below.    Josie PERRY RN

## 2019-01-31 NOTE — TELEPHONE ENCOUNTER
"Requested Prescriptions   Pending Prescriptions Disp Refills     lisinopril (PRINIVIL/ZESTRIL) 10 MG tablet [Pharmacy Med Name: LISINOPRIL 10MG TABLETS]  Last Written Prescription Date:  12/26/2018  Last Fill Quantity: 30,  # refills: 0   Last office visit: 11/27/2017 with prescribing provider:  Jesse    Future Office Visit:     30 tablet 0     Sig: TAKE 1 TABLET BY MOUTH DAILY. NEED FOLLOW-UP APPOINTMENT FOR THIS MEDICATION.    ACE Inhibitors (Including Combos) Protocol Failed - 1/31/2019 11:36 AM       Failed - Blood pressure under 140/90 in past 12 months    BP Readings from Last 3 Encounters:   11/11/18 (!) 167/102   06/27/18 119/78   11/27/17 133/82                Failed - Recent (12 mo) or future (30 days) visit within the authorizing provider's specialty    Patient had office visit in the last 12 months or has a visit in the next 30 days with authorizing provider or within the authorizing provider's specialty.  See \"Patient Info\" tab in inbasket, or \"Choose Columns\" in Meds & Orders section of the refill encounter.             Passed - Medication is active on med list       Passed - Patient is age 18 or older       Passed - Normal serum creatinine on file in past 12 months    Recent Labs   Lab Test 03/08/18  1252   CR 1.09            Passed - Normal serum potassium on file in past 12 months    Recent Labs   Lab Test 03/08/18  1252   POTASSIUM 4.5               "

## 2019-02-01 RX ORDER — LISINOPRIL 10 MG/1
TABLET ORAL
Qty: 15 TABLET | Refills: 0 | Status: SHIPPED | OUTPATIENT
Start: 2019-02-01 | End: 2019-02-12

## 2019-02-01 NOTE — TELEPHONE ENCOUNTER
Patient calls and states his work schedule changes every week so he has to cancel his upcoming appt.  I have explained in depth our medication refill policy. Patient states he will be out of medication on 2/4/19.  Last office visit was 11/2017.   Refilled for #15. Patient is notified no further refills Nay LANE RN

## 2019-02-12 ENCOUNTER — OFFICE VISIT (OUTPATIENT)
Dept: FAMILY MEDICINE | Facility: CLINIC | Age: 49
End: 2019-02-12
Payer: COMMERCIAL

## 2019-02-12 VITALS
DIASTOLIC BLOOD PRESSURE: 76 MMHG | TEMPERATURE: 96.6 F | SYSTOLIC BLOOD PRESSURE: 106 MMHG | RESPIRATION RATE: 14 BRPM | OXYGEN SATURATION: 95 % | HEART RATE: 96 BPM | HEIGHT: 75 IN | WEIGHT: 246.8 LBS | BODY MASS INDEX: 30.69 KG/M2

## 2019-02-12 DIAGNOSIS — I10 BENIGN ESSENTIAL HYPERTENSION: Primary | ICD-10-CM

## 2019-02-12 DIAGNOSIS — R73.03 PREDIABETES: ICD-10-CM

## 2019-02-12 DIAGNOSIS — E66.9 NON MORBID OBESITY, UNSPECIFIED OBESITY TYPE: ICD-10-CM

## 2019-02-12 DIAGNOSIS — E78.2 MIXED HYPERLIPIDEMIA: ICD-10-CM

## 2019-02-12 LAB
ALT SERPL W P-5'-P-CCNC: 56 U/L (ref 0–70)
ANION GAP SERPL CALCULATED.3IONS-SCNC: 4 MMOL/L (ref 3–14)
BUN SERPL-MCNC: 16 MG/DL (ref 7–30)
CALCIUM SERPL-MCNC: 8.5 MG/DL (ref 8.5–10.1)
CHLORIDE SERPL-SCNC: 104 MMOL/L (ref 94–109)
CHOLEST SERPL-MCNC: 179 MG/DL
CO2 SERPL-SCNC: 29 MMOL/L (ref 20–32)
CREAT SERPL-MCNC: 1.18 MG/DL (ref 0.66–1.25)
GFR SERPL CREATININE-BSD FRML MDRD: 72 ML/MIN/{1.73_M2}
GLUCOSE SERPL-MCNC: 101 MG/DL (ref 70–99)
HBA1C MFR BLD: 5.3 % (ref 0–5.6)
HDLC SERPL-MCNC: 39 MG/DL
LDLC SERPL CALC-MCNC: 108 MG/DL
NONHDLC SERPL-MCNC: 140 MG/DL
POTASSIUM SERPL-SCNC: 4.4 MMOL/L (ref 3.4–5.3)
SODIUM SERPL-SCNC: 137 MMOL/L (ref 133–144)
TRIGL SERPL-MCNC: 158 MG/DL

## 2019-02-12 PROCEDURE — 36415 COLL VENOUS BLD VENIPUNCTURE: CPT | Performed by: NURSE PRACTITIONER

## 2019-02-12 PROCEDURE — 99214 OFFICE O/P EST MOD 30 MIN: CPT | Performed by: FAMILY MEDICINE

## 2019-02-12 PROCEDURE — 83036 HEMOGLOBIN GLYCOSYLATED A1C: CPT | Performed by: NURSE PRACTITIONER

## 2019-02-12 PROCEDURE — 84460 ALANINE AMINO (ALT) (SGPT): CPT | Performed by: NURSE PRACTITIONER

## 2019-02-12 PROCEDURE — 80061 LIPID PANEL: CPT | Performed by: NURSE PRACTITIONER

## 2019-02-12 PROCEDURE — 80048 BASIC METABOLIC PNL TOTAL CA: CPT | Performed by: NURSE PRACTITIONER

## 2019-02-12 RX ORDER — LISINOPRIL 10 MG/1
10 TABLET ORAL DAILY
Qty: 90 TABLET | Refills: 3 | Status: SHIPPED | OUTPATIENT
Start: 2019-02-12 | End: 2020-02-24

## 2019-02-12 ASSESSMENT — MIFFLIN-ST. JEOR: SCORE: 2062.17

## 2019-02-12 NOTE — LETTER
February 12, 2019      Elpidio Swift  [address] [place] [address]        Dear ,    We are writing to inform you of your test results.  Cholesterol level is slightly high.   Fasting blood sugar level is borderline above desirable level but not diabetic.     Follow the dietary and exercise recommendations given on your visit.   Refer to your visit summary for them.     Your blood tests may be repeated in a year.     If you have any questions or concerns, please call the clinic at the number listed above.       Sincerely,        Steven Lund MD/santiago

## 2019-02-12 NOTE — PROGRESS NOTES
SUBJECTIVE:   Elpidio Swift is a 48 year old male who presents to clinic today for the following health issues:  Chief Complaint   Patient presents with     Hypertension     Pt here for a f/u on b/p med and needs refilled     Blood Draw     Fasting for labwork.  Future orders released.       Hypertension Follow-up      Outpatient blood pressures are not being checked.    Low Salt Diet: no added salt    Amount of exercise or physical activity: walks 1 mile per day    Problems taking medications regularly: No    Medication side effects: none    Diet: low salt but patient admits going to fastfood       Medication Followup of lisinopril    Taking Medication as prescribed: yes    Side Effects:  None    Medication Helping Symptoms:  Yes     Patient denies changes in BM or urination.    Problem list and histories reviewed & adjusted, as indicated.  Additional history: as documented    Patient Active Problem List   Diagnosis     Hyperlipidemia     HYPERLIPIDEMIA LDL GOAL <160     Autosensitization dermatitis     ROSALINA (obstructive sleep apnea)     Benign essential hypertension     Prediabetes     History reviewed. No pertinent surgical history.    Social History     Tobacco Use     Smoking status: Never Smoker     Smokeless tobacco: Never Used   Substance Use Topics     Alcohol use: Yes     Alcohol/week: 0.0 oz     Comment: few per week     Family History   Problem Relation Age of Onset     Diabetes Mother 83     Unknown/Adopted Maternal Grandfather      Unknown/Adopted Paternal Grandmother      Cerebrovascular Disease Paternal Grandfather      Psychotic Disorder Brother         depression     Psychotic Disorder Daughter         ADHD     Psychotic Disorder Brother         depression and possible schizophrenia     Psychotic Disorder Brother         depression     Cardiac Sudden Death Brother          at age 57- cardiac arrest     Asthma Brother          Current Outpatient Medications   Medication Sig Dispense Refill  "    lisinopril (PRINIVIL/ZESTRIL) 10 MG tablet Take 1 tablet (10 mg) by mouth daily 90 tablet 3     ORDER FOR DME Elpidio was set up on [employer]  Pressure: set 7  Mask of choice: Wisp  Size: Large cushion    Follow up appointment is scheduled: April 13, 2015       No Known Allergies  BP Readings from Last 3 Encounters:   02/12/19 106/76   11/11/18 (!) 167/102   06/27/18 119/78    Wt Readings from Last 3 Encounters:   02/12/19 111.9 kg (246 lb 12.8 oz)   11/11/18 108 kg (238 lb)   06/27/18 106.6 kg (235 lb)         The 10-year ASCVD risk score (Navin RIVERS Jr., et al., 2013) is: 2.9%    Values used to calculate the score:      Age: 48 years      Sex: Male      Is Non- : No      Diabetic: No      Tobacco smoker: No      Systolic Blood Pressure: 106 mmHg      Is BP treated: Yes      HDL Cholesterol: 39 mg/dL      Total Cholesterol: 179 mg/dL             Reviewed and updated as needed this visit by clinical staff  Tobacco  Allergies  Meds  Problems  Med Hx  Surg Hx  Fam Hx  Soc Hx        Reviewed and updated as needed this visit by Provider  Tobacco  Allergies  Meds  Problems  Med Hx  Surg Hx  Fam Hx         ROS:  C: NEGATIVE for fever, chills, change in weight  I: NEGATIVE for worrisome rashes, moles or lesions  E: NEGATIVE for vision changes or irritation  R: NEGATIVE for significant cough or SOB  CV: NEGATIVE for chest pain, palpitations or peripheral edema  GI: NEGATIVE for nausea, abdominal pain, heartburn, or change in bowel habits  : NEGATIVE for frequency, dysuria, or hematuria  M: NEGATIVE for significant arthralgias or myalgia  N: NEGATIVE for weakness, dizziness or paresthesias  E: NEGATIVE for temperature intolerance, skin/hair changes  H: NEGATIVE for bleeding problems    OBJECTIVE:                                                    /76   Pulse 96   Temp 96.6  F (35.9  C) (Tympanic)   Resp 14   Ht 1.892 m (6' 2.5\")   Wt 111.9 kg (246 lb 12.8 oz)   " SpO2 95%   BMI 31.26 kg/m    Body mass index is 31.26 kg/m .  GENERAL: obese,  alert and no distress, ambulatory w/o assist  NECK: no tenderness, no adenopathy,  Thyroid not enlarged  RESP: lungs clear to auscultation - no rales, no rhonchi, no wheezes  CV: regular rates and rhythm, no murmur  MS: no edema  SKIN: no suspicious lesions, no rashes  NEURO: strength and tone- normal, sensory exam- grossly normal, mentation- intact, speech- normal, reflexes- symmetric  ABD:  nontender    Diagnostic test results:  Diagnostic Test Results:  Results for orders placed or performed in visit on 02/12/19   Lipid panel reflex to direct LDL Fasting   Result Value Ref Range    Cholesterol 179 <200 mg/dL    Triglycerides 158 (H) <150 mg/dL    HDL Cholesterol 39 (L) >39 mg/dL    LDL Cholesterol Calculated 108 (H) <100 mg/dL    Non HDL Cholesterol 140 (H) <130 mg/dL   **ALT FUTURE anytime   Result Value Ref Range    ALT 56 0 - 70 U/L   **Basic metabolic panel FUTURE anytime   Result Value Ref Range    Sodium 137 133 - 144 mmol/L    Potassium 4.4 3.4 - 5.3 mmol/L    Chloride 104 94 - 109 mmol/L    Carbon Dioxide 29 20 - 32 mmol/L    Anion Gap 4 3 - 14 mmol/L    Glucose 101 (H) 70 - 99 mg/dL    Urea Nitrogen 16 7 - 30 mg/dL    Creatinine 1.18 0.66 - 1.25 mg/dL    GFR Estimate 72 >60 mL/min/[1.73_m2]    GFR Estimate If Black 83 >60 mL/min/[1.73_m2]    Calcium 8.5 8.5 - 10.1 mg/dL   Hemoglobin A1c   Result Value Ref Range    Hemoglobin A1C 5.3 0 - 5.6 %        ASSESSMENT/PLAN:                                                        ICD-10-CM    1. Benign essential hypertension I10 lisinopril (PRINIVIL/ZESTRIL) 10 MG tablet     Lipid panel reflex to direct LDL Fasting     **ALT FUTURE anytime     **Basic metabolic panel FUTURE anytime  Controlled.  Low salt, low fat diet.   Exercise as tolerated.  Take meds as prescribed; call if with side effects.      2. Mixed hyperlipidemia E78.2 Lipid panel reflex to direct LDL Fasting     **ALT  FUTURE anytime  Reinforced heart healthy lifestyle.  Consider statin if clinically indicated based on lipid levels and his cardiovascular risk score.     3. Prediabetes R73.03 A1c ordered today.     4 Non-morbid obeseity  Discussed risks of obesity: heart disease, stroke, end-organ damage, DM, decreased quality of life  Counselled on diet, exercise and weight loss regimen       Follow up with Provider - 1 year or prn   Patient Instructions   You will be contacted in 1-2 days for results of your lab tests.    Be consistent with low salt, low trans fat and low saturated fat diet.  Eat food rich in omega-3-fatty acids as you tolerate. (salmon, olive oil)  Eat 5 cups of vegetables, fruits and whole grains per day.  Limit starchy food (white rice, white bread, white pasta, white potatoes) to less than a cup per meal.  Minimize sweets, junk food and fastfood. Limit soda beverages to one serving per day; best to avoid it altogether though.  Exercise: moderate intensity sustained for at least 30 mins per episode, goal of 150 mins per week at least  Combine cardiovascular and resistance exercises.  These exercise recommendations are in addition to your daily activity at work or home.  Work on losing weight if you are above your goal body mass index.          Steven Lund MD  [facility]

## 2019-02-12 NOTE — PATIENT INSTRUCTIONS
You will be contacted in 1-2 days for results of your lab tests.    Be consistent with low salt, low trans fat and low saturated fat diet.  Eat food rich in omega-3-fatty acids as you tolerate. (salmon, olive oil)  Eat 5 cups of vegetables, fruits and whole grains per day.  Limit starchy food (white rice, white bread, white pasta, white potatoes) to less than a cup per meal.  Minimize sweets, junk food and fastfood. Limit soda beverages to one serving per day; best to avoid it altogether though.  Exercise: moderate intensity sustained for at least 30 mins per episode, goal of 150 mins per week at least  Combine cardiovascular and resistance exercises.  These exercise recommendations are in addition to your daily activity at work or home.  Work on losing weight if you are above your goal body mass index.

## 2019-02-12 NOTE — LETTER
"February 13, 2019      Elpidio Swift  [address] [facility] [address]        Dear ,    We are writing to inform you of your test results.    1. Liver function normal  2. Electrolytes, kidney function, glucose level all normal  3. LDL (bad cholesterol) and triglycerides slightly elevated. Recommend to follow low fat, or Mediterranean diet.   -a Mediterranean diet appears to reduce the risk of cardiovascular events. There is no single Mediterranean diet, but such diets are typically high in fruits, vegetables, whole grains, beans, nuts, and seeds and include olive oil as an important source of fat; there are typically low to moderate amounts of fish, poultry, and dairy products, and there is little red meat.  Exercise daily.  Add fiber supplement, certain soluble fibers, such as Metamucil, Psyllium will help to reduce LDL, or \"bad cholesterol\".     Resulted Orders   Lipid panel reflex to direct LDL Fasting   Result Value Ref Range    Cholesterol 179 <200 mg/dL    Triglycerides 158 (H) <150 mg/dL      Comment:      Borderline high:  150-199 mg/dl  High:             200-499 mg/dl  Very high:       >499 mg/dl  Fasting specimen      HDL Cholesterol 39 (L) >39 mg/dL    LDL Cholesterol Calculated 108 (H) <100 mg/dL      Comment:      Above desirable:  100-129 mg/dl  Borderline High:  130-159 mg/dL  High:             160-189 mg/dL  Very high:       >189 mg/dl      Non HDL Cholesterol 140 (H) <130 mg/dL      Comment:      Above Desirable:  130-159 mg/dl  Borderline high:  160-189 mg/dl  High:             190-219 mg/dl  Very high:       >219 mg/dl     **ALT FUTURE anytime   Result Value Ref Range    ALT 56 0 - 70 U/L   **Basic metabolic panel FUTURE anytime   Result Value Ref Range    Sodium 137 133 - 144 mmol/L    Potassium 4.4 3.4 - 5.3 mmol/L    Chloride 104 94 - 109 mmol/L    Carbon Dioxide 29 20 - 32 mmol/L    Anion Gap 4 3 - 14 mmol/L    Glucose 101 (H) 70 - 99 mg/dL      Comment:      Fasting " specimen    Urea Nitrogen 16 7 - 30 mg/dL    Creatinine 1.18 0.66 - 1.25 mg/dL    GFR Estimate 72 >60 mL/min/[1.73_m2]      Comment:      Non  GFR Calc  Starting 12/18/2018, serum creatinine based estimated GFR (eGFR) will be   calculated using the Chronic Kidney Disease Epidemiology Collaboration   (CKD-EPI) equation.      GFR Estimate If Black 83 >60 mL/min/[1.73_m2]      Comment:       GFR Calc  Starting 12/18/2018, serum creatinine based estimated GFR (eGFR) will be   calculated using the Chronic Kidney Disease Epidemiology Collaboration   (CKD-EPI) equation.      Calcium 8.5 8.5 - 10.1 mg/dL   Hemoglobin A1c   Result Value Ref Range    Hemoglobin A1C 5.3 0 - 5.6 %      Comment:      Normal <5.7% Prediabetes 5.7-6.4%  Diabetes 6.5% or higher - adopted from ADA   consensus guidelines.         If you have any questions or concerns, please call the clinic at the number listed above.       Sincerely,      Carli Molina NP

## 2019-02-18 PROBLEM — E66.9 NON MORBID OBESITY, UNSPECIFIED OBESITY TYPE: Status: ACTIVE | Noted: 2019-02-18

## 2019-06-17 ENCOUNTER — TELEPHONE (OUTPATIENT)
Dept: FAMILY MEDICINE | Facility: CLINIC | Age: 49
End: 2019-06-17

## 2019-06-17 NOTE — TELEPHONE ENCOUNTER
Panel Management Review    Composite cancer screening  Chart review shows that this patient is due/due soon for the following Colonoscopy  Summary:    Patient is due/failing the following:   COLONOSCOPY    Action needed:   Patient needs referral/order: colonoscopy    Type of outreach:    Sent letter.    Questions for provider review:    None                                                                                                                                    Mandy James CMA

## 2019-06-17 NOTE — LETTER
June 17, 2019      Elpidio ENRRIQUE Swift  [address] [facility]  [place] [address]          At this time you are due for a Colon Cancer Screening. Here is some information regarding this testing.     Recommended every 5-10 years, depending on your history, in order to prevent and detect colon cancer at its earliest stages.  Colon cancer is now the second leading cause of death in the [place] for both men and women and there are over 130,000 new cases and 50,000 deaths per year from colon cancer.  Colonoscopies can prevent 90-95% of these deaths.  Problem lesions can be removed before they ever become cancer. This test is not only looking for cancer, but also getting rid of precancerious lesions. You are usually given some sedation which makes the test very comfortable for most people.      If you do not wish to do a colonoscopy or cannot afford to do one, at this time, there is another option. It is called a FIT test or Fecal Immunochemical Occult Blood Test (take home stool sample kit).  It does not replace the colonoscopy for colorectal cancer screening, but it can detect hidden bleeding in the lower colon.  It does need to be repeated every year and if a positive result is obtained, you would be referred for a colonoscopy. The FIT test is really easy to do and does not require any  diet or medication restrictions and involves only one collection sample.      If you have completed either one of these tests or had a flexible sigmoidoscopy in the past five years at another facility, please have the records sent to our clinic so that we can best coordinate your care and update your chart.  Please call us if you have questions or would like arrange either to do a colonoscopy or obtain the necessary test kit for the Fecal Occult Test.      Sincerely,        Steven Lund MD

## 2019-06-27 ENCOUNTER — TELEPHONE (OUTPATIENT)
Dept: SLEEP MEDICINE | Facility: CLINIC | Age: 49
End: 2019-06-27

## 2019-06-27 NOTE — TELEPHONE ENCOUNTER
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 The information was printed out and placed at the  for . Pt has been notified.    Nichol Paula, CMA

## 2019-12-05 DIAGNOSIS — G47.33 OBSTRUCTIVE SLEEP APNEA (ADULT) (PEDIATRIC): Primary | ICD-10-CM

## 2019-12-29 ENCOUNTER — HOSPITAL ENCOUNTER (EMERGENCY)
Facility: CLINIC | Age: 49
Discharge: HOME | End: 2019-12-29
Attending: NURSE PRACTITIONER | Admitting: NURSE PRACTITIONER
Payer: COMMERCIAL

## 2019-12-29 VITALS
TEMPERATURE: 98 F | BODY MASS INDEX: 32.08 KG/M2 | DIASTOLIC BLOOD PRESSURE: 91 MMHG | HEART RATE: 56 BPM | HEIGHT: 74 IN | RESPIRATION RATE: 18 BRPM | SYSTOLIC BLOOD PRESSURE: 135 MMHG | WEIGHT: 250 LBS | OXYGEN SATURATION: 95 %

## 2019-12-29 DIAGNOSIS — J20.9 ACUTE BRONCHITIS, UNSPECIFIED ORGANISM: ICD-10-CM

## 2019-12-29 PROCEDURE — 99213 OFFICE O/P EST LOW 20 MIN: CPT | Mod: Z6 | Performed by: NURSE PRACTITIONER

## 2019-12-29 PROCEDURE — G0463 HOSPITAL OUTPT CLINIC VISIT: HCPCS

## 2019-12-29 RX ORDER — AZITHROMYCIN 250 MG/1
TABLET, FILM COATED ORAL
Qty: 6 TABLET | Refills: 0 | Status: SHIPPED | OUTPATIENT
Start: 2019-12-29 | End: 2020-02-24

## 2019-12-29 RX ORDER — BENZONATATE 200 MG/1
200 CAPSULE ORAL 3 TIMES DAILY PRN
Qty: 15 CAPSULE | Refills: 0 | Status: SHIPPED | OUTPATIENT
Start: 2019-12-29 | End: 2020-02-24

## 2019-12-29 ASSESSMENT — ENCOUNTER SYMPTOMS
WHEEZING: 0
SORE THROAT: 0
VOMITING: 0
NEUROLOGICAL NEGATIVE: 1
NAUSEA: 0
FEVER: 0
FATIGUE: 0
SHORTNESS OF BREATH: 0
CHILLS: 0
COUGH: 1

## 2019-12-29 ASSESSMENT — MIFFLIN-ST. JEOR: SCORE: 2063.74

## 2019-12-29 NOTE — DISCHARGE INSTRUCTIONS
Z-Abdulaziz per package instructions.  Tessalon 200 mg 3 times a day as needed for cough.  Drink plenty of fluids.  Recheck if not improving in 5 to 7 days, or sooner for any worsening.

## 2019-12-29 NOTE — ED PROVIDER NOTES
History     Chief Complaint   Patient presents with     URI     HPI  Elpidio Swift is a 49 year old male who presents to urgent care for evaluation of cough.  Symptoms started 1 month ago.  Cough is productive with thick/yellow sputum.  No fever or chills.  No chest pain or shortness of breath.  Thought he was getting better on Sharon, but then worsened the last few days.  Patient is a non-smoker.    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  Rash responds well to triamcinolone cream.       HYPERLIPIDEMIA LDL GOAL <160 10/31/2010     Priority: Medium     Hyperlipidemia 2007     Priority: Medium     Problem list name updated by automated process. Provider to review          Past Medical History:    History reviewed. No pertinent past medical history.    Past Surgical History:    History reviewed. No pertinent surgical history.    Family History:    Family History   Problem Relation Age of Onset     Diabetes Mother 83     Unknown/Adopted Maternal Grandfather      Unknown/Adopted Paternal Grandmother      Cerebrovascular Disease Paternal Grandfather      Psychotic Disorder Brother         depression     Psychotic Disorder Daughter         ADHD     Psychotic Disorder Brother         depression and possible schizophrenia     Psychotic Disorder Brother         depression     Cardiac Sudden Death Brother          at age 57- cardiac arrest     Asthma Brother   "      Social History:  Marital Status:  Single [1]  Social History     Tobacco Use     Smoking status: Never Smoker     Smokeless tobacco: Never Used   Substance Use Topics     Alcohol use: Yes     Alcohol/week: 0.0 standard drinks     Comment: few per week     Drug use: No        Medications:    azithromycin (ZITHROMAX Z-ROXANE) 250 MG tablet  benzonatate (TESSALON) 200 MG capsule  lisinopril (PRINIVIL/ZESTRIL) 10 MG tablet  ORDER FOR DME          Review of Systems   Constitutional: Negative for chills, fatigue and fever.   HENT: Positive for congestion. Negative for ear pain and sore throat.    Respiratory: Positive for cough. Negative for shortness of breath and wheezing.    Gastrointestinal: Negative for nausea and vomiting.   Neurological: Negative.        Physical Exam   BP: (!) 150/99  Pulse: 63  Temp: 98  F (36.7  C)  Resp: 18  Height: 188 cm (6' 2\")  Weight: 113.4 kg (250 lb)  SpO2: 95 %      Physical Exam    GENERAL APPEARANCE: alert and oriented. NAD.   EYES: conjunctiva clear  HENT: bilateral ear canals clear, intact, and without inflammation. Right TM normal. Left TM normal. Nose normal.  Oropharynx without ulcers, erythema or lesions  NECK: supple, nontender, no lymphadenopathy  RESP: lungs clear to auscultation - no rales, rhonchi or wheezes  CV: regular rates and rhythm, normal S1 S2, no murmur noted      ED Course        Procedures               No results found for this or any previous visit (from the past 24 hour(s)).    Medications - No data to display    Assessments & Plan (with Medical Decision Making)   History and exam is consistent with acute bronchitis.  I have low suspicion for pneumonia.  I discussed with patient these are often viral in nature given his 1 month of persistent symptoms and worsening over the last week he will be treated with antibiotics to cover for bacterial pathogen.      Plan as follows:  Z-Roxane per package instructions.  Tessalon 200 mg 3 times a day as needed for " cough.  Drink plenty of fluids.  Recheck if not improving in 5 to 7 days, or sooner for any worsening  I have reviewed the nursing notes.    I have reviewed the findings, diagnosis, plan and need for follow up with the patient.      Discharge Medication List as of 12/29/2019  2:19 PM      START taking these medications    Details   azithromycin (ZITHROMAX Z-ROXANE) 250 MG tablet Two tablets on the first day, then one tablet daily for the next 4 days, Disp-6 tablet, R-0, E-Prescribe      benzonatate (TESSALON) 200 MG capsule Take 1 capsule (200 mg) by mouth 3 times daily as needed for cough, Disp-15 capsule, R-0, E-Prescribe             Final diagnoses:   Acute bronchitis, unspecified organism       12/29/2019   [facility] EMERGENCY DEPARTMENT     Selena Pineda APRN CNP  12/29/19 [id number]

## 2019-12-29 NOTE — ED AVS SNAPSHOT
[facility] Emergency Department  [address] [address]  Phone:  [phone]  Fax:  [phone]                                    Elpidio Swift   MRN: [id number]    Department:  [facility] Emergency Department   Date of Visit:  12/29/2019           After Visit Summary Signature Page    I have received my discharge instructions, and my questions have been answered. I have discussed any challenges I see with this plan with the nurse or doctor.    ..........................................................................................................................................  Patient/Patient Representative Signature      ..........................................................................................................................................  Patient Representative Print Name and Relationship to Patient    ..................................................               ................................................  Date                                   Time    ..........................................................................................................................................  Reviewed by Signature/Title    ...................................................              ..............................................  Date                                               Time          22EPIC Rev 08/18       
normal...

## 2020-02-24 ENCOUNTER — OFFICE VISIT (OUTPATIENT)
Dept: FAMILY MEDICINE | Facility: CLINIC | Age: 50
End: 2020-02-24
Payer: COMMERCIAL

## 2020-02-24 VITALS
OXYGEN SATURATION: 96 % | SYSTOLIC BLOOD PRESSURE: 126 MMHG | DIASTOLIC BLOOD PRESSURE: 84 MMHG | RESPIRATION RATE: 12 BRPM | HEART RATE: 63 BPM | HEIGHT: 74 IN | WEIGHT: 244 LBS | TEMPERATURE: 96.7 F | BODY MASS INDEX: 31.32 KG/M2

## 2020-02-24 DIAGNOSIS — E78.2 MIXED HYPERLIPIDEMIA: ICD-10-CM

## 2020-02-24 DIAGNOSIS — R73.03 PREDIABETES: ICD-10-CM

## 2020-02-24 DIAGNOSIS — I10 BENIGN ESSENTIAL HYPERTENSION: Primary | ICD-10-CM

## 2020-02-24 DIAGNOSIS — E66.9 NON MORBID OBESITY, UNSPECIFIED OBESITY TYPE: ICD-10-CM

## 2020-02-24 DIAGNOSIS — Z12.11 SCREEN FOR COLON CANCER: ICD-10-CM

## 2020-02-24 LAB
ANION GAP SERPL CALCULATED.3IONS-SCNC: 3 MMOL/L (ref 3–14)
BUN SERPL-MCNC: 16 MG/DL (ref 7–30)
CALCIUM SERPL-MCNC: 9.2 MG/DL (ref 8.5–10.1)
CHLORIDE SERPL-SCNC: 106 MMOL/L (ref 94–109)
CHOLEST SERPL-MCNC: 175 MG/DL
CO2 SERPL-SCNC: 29 MMOL/L (ref 20–32)
CREAT SERPL-MCNC: 1.18 MG/DL (ref 0.66–1.25)
GFR SERPL CREATININE-BSD FRML MDRD: 71 ML/MIN/{1.73_M2}
GLUCOSE SERPL-MCNC: 95 MG/DL (ref 70–99)
HBA1C MFR BLD: 5.2 % (ref 0–5.6)
HDLC SERPL-MCNC: 37 MG/DL
LDLC SERPL CALC-MCNC: 105 MG/DL
NONHDLC SERPL-MCNC: 138 MG/DL
POTASSIUM SERPL-SCNC: 4.7 MMOL/L (ref 3.4–5.3)
SODIUM SERPL-SCNC: 138 MMOL/L (ref 133–144)
TRIGL SERPL-MCNC: 167 MG/DL

## 2020-02-24 PROCEDURE — 83036 HEMOGLOBIN GLYCOSYLATED A1C: CPT | Performed by: FAMILY MEDICINE

## 2020-02-24 PROCEDURE — 36415 COLL VENOUS BLD VENIPUNCTURE: CPT | Performed by: FAMILY MEDICINE

## 2020-02-24 PROCEDURE — 80048 BASIC METABOLIC PNL TOTAL CA: CPT | Performed by: FAMILY MEDICINE

## 2020-02-24 PROCEDURE — 80061 LIPID PANEL: CPT | Performed by: FAMILY MEDICINE

## 2020-02-24 PROCEDURE — 99214 OFFICE O/P EST MOD 30 MIN: CPT | Performed by: FAMILY MEDICINE

## 2020-02-24 RX ORDER — LISINOPRIL 10 MG/1
10 TABLET ORAL DAILY
Qty: 90 TABLET | Refills: 3 | Status: SHIPPED | OUTPATIENT
Start: 2020-02-24 | End: 2021-02-25

## 2020-02-24 ASSESSMENT — MIFFLIN-ST. JEOR: SCORE: 2040.5

## 2020-02-24 NOTE — PATIENT INSTRUCTIONS
Be consistent with low salt, low trans fat and low saturated fat diet.  Eat food rich in omega-3-fatty acids as you tolerate. (salmon, olive oil)  Eat 5 cups of vegetables, fruits and whole grains per day.  Limit starchy food (white rice, white bread, white pasta, white potatoes) to less than a cup per meal.  Minimize sweets, junk food and fastfood. Limit soda beverages to one serving per day; best to avoid it altogether though.    Exercise: moderate intensity sustained for at least 30 mins per episode, goal of 150 mins per week at least  Combine cardiovascular and resistance exercises.  These exercise recommendations are in addition to your daily activity at work or home.  Work on losing weight.    Blood tests: You will be contacted in 1-2 days for results of your lab tests.    Schedule colonoscopy for screening for colon cancer at your soonest convenience.    You declined flu shot before. Reconsider this to protect yourself from the illness and its complications. You may still get this before April 2020. Get this then every October or November.

## 2020-02-24 NOTE — NURSING NOTE
"Initial /84   Pulse 63   Temp 96.7  F (35.9  C) (Tympanic)   Resp 12   Ht 1.886 m (6' 2.25\")   Wt 110.7 kg (244 lb)   SpO2 96%   BMI 31.12 kg/m   Estimated body mass index is 31.12 kg/m  as calculated from the following:    Height as of this encounter: 1.886 m (6' 2.25\").    Weight as of this encounter: 110.7 kg (244 lb). .    Blanquita Husain, LALITA    "

## 2020-02-24 NOTE — PROGRESS NOTES
Subjective     Elpidio Swift is a 49 year old male who presents to clinic today for the following health issues:    HPI   Hypertension Follow-up      Do you check your blood pressure regularly outside of the clinic? No     Are you following a low salt diet? No    Are your blood pressures ever more than 140 on the top number (systolic) OR more   than 90 on the bottom number (diastolic), for example 140/90? No  Patient is fasting today for blood tests.      How many servings of fruits and vegetables do you eat daily?  unsure    On average, how many sweetened beverages do you drink each day (Examples: soda, juice, sweet tea, etc.  Do NOT count diet or artificially sweetened beverages)?   2    How many days per week do you exercise enough to make your heart beat faster? 3 or less    How many minutes a day do you exercise enough to make your heart beat faster? 9 or less    How many days per week do you miss taking your medication? 0    Preventive health:  Colon cancer screening    The 10-year ASCVD risk score (Navin RIVERS Jr., et al., 2013) is: 4.5%    Values used to calculate the score:      Age: 49 years      Sex: Male      Is Non- : No      Diabetic: No      Tobacco smoker: No      Systolic Blood Pressure: 126 mmHg      Is BP treated: Yes      HDL Cholesterol: 37 mg/dL      Total Cholesterol: 175 mg/dL    Patient Active Problem List   Diagnosis     Hyperlipidemia     HYPERLIPIDEMIA LDL GOAL <160     Autosensitization dermatitis     ROSALINA (obstructive sleep apnea)     Benign essential hypertension     Prediabetes     Non morbid obesity, unspecified obesity type     History reviewed. No pertinent surgical history.    Social History     Tobacco Use     Smoking status: Never Smoker     Smokeless tobacco: Never Used   Substance Use Topics     Alcohol use: Yes     Alcohol/week: 0.0 standard drinks     Comment: few per week     Family History   Problem Relation Age of Onset     Diabetes Mother 83      "Unknown/Adopted Maternal Grandfather      Unknown/Adopted Paternal Grandmother      Cerebrovascular Disease Paternal Grandfather      Psychotic Disorder Brother         depression     Psychotic Disorder Daughter         ADHD     Psychotic Disorder Brother         depression and possible schizophrenia     Psychotic Disorder Brother         depression     Cardiac Sudden Death Brother          at age 57- cardiac arrest     Asthma Brother          Current Outpatient Medications   Medication Sig Dispense Refill     lisinopril (ZESTRIL) 10 MG tablet Take 1 tablet (10 mg) by mouth daily 90 tablet 3     benzonatate (TESSALON) 200 MG capsule Take 1 capsule (200 mg) by mouth 3 times daily as needed for cough (Patient not taking: Reported on 2020) 15 capsule 0     ORDER FOR DME Elpidio was set up on [employer]  Pressure: set 7  Mask of choice: Wisp  Size: Large cushion    Follow up appointment is scheduled: 2015       No Known Allergies    Reviewed and updated as needed this visit by Provider  Tobacco  Allergies  Meds  Problems  Med Hx  Surg Hx  Fam Hx         Review of Systems   ROS COMP: Constitutional, HEENT, cardiovascular, pulmonary, GI, , musculoskeletal, neuro, skin, endocrine and psych systems are negative, except as otherwise noted.      Objective    /84   Pulse 63   Temp 96.7  F (35.9  C) (Tympanic)   Resp 12   Ht 1.886 m (6' 2.25\")   Wt 110.7 kg (244 lb)   SpO2 96%   BMI 31.12 kg/m    Body mass index is 31.12 kg/m .  Physical Exam   GENERAL: obese, alert and no distress, ambulatory w/o assist  NECK: no tenderness, no adenopathy,  Thyroid not enlarged  RESP: lungs clear to auscultation - no rales, no rhonchi, no wheezes  CV: regular rates and rhythm, no murmur  MS: no edema  SKIN: no suspicious lesions, no rashes  NEURO: strength and tone- normal, sensory exam- grossly normal, mentation- intact, speech- normal, reflexes- symmetric  ABD:  nontender    Diagnostic Test " "Results:  Labs reviewed in Epic        Assessment & Plan     Elpidio was seen today for hypertension.    Diagnoses and all orders for this visit:    Benign essential hypertension  -     BASIC METABOLIC PANEL  -     lisinopril (ZESTRIL) 10 MG tablet; Take 1 tablet (10 mg) by mouth daily  Controlled.  Low salt, low fat diet.   Exercise as tolerated.  Take meds as prescribed; call if with side effects.     Mixed hyperlipidemia  -     Lipid panel reflex to direct LDL Fasting  Reinforced heart healthy lifestyle.    Prediabetes  -     HEMOGLOBIN A1C  Advised carb-contro and consistent exercise year round.    Non morbid obesity, unspecified obesity type  Discussed risks of obesity: heart disease, stroke, end-organ damage, worsening DM, decreased quality of life  Counselled on diet, exercise and weight loss regimen    Screen for colon cancer  -     GASTROENTEROLOGY ADULT REF PROCEDURE ONLY         BMI:   Estimated body mass index is 31.12 kg/m  as calculated from the following:    Height as of this encounter: 1.886 m (6' 2.25\").    Weight as of this encounter: 110.7 kg (244 lb).   Weight management plan: Discussed healthy diet and exercise guidelines        Patient Instructions   Be consistent with low salt, low trans fat and low saturated fat diet.  Eat food rich in omega-3-fatty acids as you tolerate. (salmon, olive oil)  Eat 5 cups of vegetables, fruits and whole grains per day.  Limit starchy food (white rice, white bread, white pasta, white potatoes) to less than a cup per meal.  Minimize sweets, junk food and fastfood. Limit soda beverages to one serving per day; best to avoid it altogether though.    Exercise: moderate intensity sustained for at least 30 mins per episode, goal of 150 mins per week at least  Combine cardiovascular and resistance exercises.  These exercise recommendations are in addition to your daily activity at work or home.  Work on losing weight.    Blood tests: You will be contacted in 1-2 days for " results of your lab tests.    Schedule colonoscopy for screening for colon cancer at your soonest convenience.    You declined flu shot before. Reconsider this to protect yourself from the illness and its complications. You may still get this before April 2020. Get this then every October or November.      Return in about 1 year (around 2/24/2021).    Steven Lund MD  [facility]

## 2020-03-10 ENCOUNTER — TELEPHONE (OUTPATIENT)
Dept: FAMILY MEDICINE | Facility: CLINIC | Age: 50
End: 2020-03-10

## 2020-03-10 NOTE — LETTER
March 10, 2020      Elpidio Swift  [address]  [place] [address]          At this time you are due for a Colon Cancer Screening.  This was ordered for you on 2/24/20. Here is some information regarding this testing.     Recommended every 5-10 years, depending on your history, in order to prevent and detect colon cancer at its earliest stages.  Colon cancer is now the second leading cause of death in the [place] for both men and women and there are over 130,000 new cases and 50,000 deaths per year from colon cancer.  Colonoscopies can prevent 90-95% of these deaths.  Problem lesions can be removed before they ever become cancer. This test is not only looking for cancer, but also getting rid of precancerious lesions. You are usually given some sedation which makes the test very comfortable for most people.      If you do not wish to do a colonoscopy or cannot afford to do one, at this time, there is another option. It is called a FIT test or Fecal Immunochemical Occult Blood Test (take home stool sample kit).  It does not replace the colonoscopy for colorectal cancer screening, but it can detect hidden bleeding in the lower colon.  It does need to be repeated every year and if a positive result is obtained, you would be referred for a colonoscopy. The FIT test is really easy to do and does not require any  diet or medication restrictions and involves only one collection sample.      If you have completed either one of these tests or had a flexible sigmoidoscopy in the past five years at another facility, please have the records sent to our clinic so that we can best coordinate your care and update your chart.  Please call us if you have questions or would like arrange either to do a colonoscopy or obtain the necessary test kit for the Fecal Occult Test.      Sincerely,        Steven Lund MD

## 2020-03-10 NOTE — TELEPHONE ENCOUNTER
Panel Management Review    Composite cancer screening  Chart review shows that this patient is due/due soon for the following Colonoscopy  Summary:    Patient is due/failing the following:   COLONOSCOPY    Action needed:   Patient needs referral/order: colonoscopy ordered on 2/24/20    Type of outreach:    Sent letter.    Questions for provider review:    None                                                                                                                                    Mandy James CMA

## 2020-10-07 ENCOUNTER — OFFICE VISIT (OUTPATIENT)
Dept: FAMILY MEDICINE | Facility: CLINIC | Age: 50
End: 2020-10-07
Payer: COMMERCIAL

## 2020-10-07 VITALS
BODY MASS INDEX: 31.91 KG/M2 | SYSTOLIC BLOOD PRESSURE: 124 MMHG | WEIGHT: 250.2 LBS | DIASTOLIC BLOOD PRESSURE: 78 MMHG | OXYGEN SATURATION: 96 % | TEMPERATURE: 97.8 F | HEART RATE: 64 BPM
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
## 2020-10-07 PROCEDURE — 99213 OFFICE O/P EST LOW 20 MIN: CPT | Performed by: FAMILY MEDICINE

## 2020-10-07 RX ORDER — DOXYCYCLINE 100 MG/1
200 CAPSULE ORAL ONCE
Qty: 2 CAPSULE | Refills: 0 | Status: SHIPPED | OUTPATIENT
Start: 2020-10-07 | End: 2020-10-07

## 2020-10-07 NOTE — PROGRESS NOTES
Subjective     Elpidio Swift is a 50 year old male who presents to clinic today for the following health issues:    50 yr old male here for a tick bite.Patient reports that [place] cabin [place] and he did have exposure to ticks. One got embedded in his waist . He thinks the tick was on his skin for about 24 hours he was able to get all of it out of his skin. He denies any acute symptoms at this time.  Chief Complaint   Patient presents with     Tick Bite     found deer tick last night, Tuesday 10/6/20 on left side above waist band.  Thinks tick first attached Sunday or Monday.         HPI         Concern - tick bite    Onset: found last night   Description: patient found deer tick 10/6/20 and feels tick attached on Nikolai 10/4/20 or 10/5/20  Intensity: some itching   Progression of Symptoms:    Accompanying Signs & Symptoms: red and warm to the touch   Previous history of similar problem:   Precipitating factors:        Worsened by:   Alleviating factors:        Improved by: none  Therapies tried and outcome: showered         Review of Systems   Constitutional, HEENT, cardiovascular, pulmonary, gi and gu systems are negative, except as otherwise noted.      Objective    /78 (BP Location: Right arm, Patient Position: Sitting, Cuff Size: Adult Large)   Pulse 64   Temp 97.8  F (36.6  C) (Tympanic)   Wt 113.5 kg (250 lb 3.2 oz)   SpO2 96%   BMI 31.91 kg/m    Body mass index is 31.91 kg/m .  Physical Exam   GENERAL: healthy, alert and no distress  MS: no gross musculoskeletal defects noted, no edema  SKIN: maculopapular eruption - trunk    No results found for this or any previous visit (from the past 24 hour(s)).        Assessment & Plan     Tick bite, initial encounter  Patient was reassured, if tick is in skin for 24-48 hours and tick was removed, a prophylactic dose of the doxycycline is given. Patient cautioned about symptoms. Should he develop symptoms he asked to call the clinic.He was  also asked to apply hydrocortisone on the tick bite area.  - doxycycline hyclate (VIBRAMYCIN) 100 MG capsule; Take 2 capsules (200 mg) by mouth once for 1 dose              FUTURE APPOINTMENTS:       - Follow-up visit in one month or sooner as needed.    Return in about 4 weeks (around 11/4/2020) for Follow up.    Ivelisse De La Torre MD  [facility]

## 2020-10-07 NOTE — PATIENT INSTRUCTIONS
Patient Education     Tick Bite, Antibiotic Treatment    Ticks are small arachnids that feed on the blood of rodents, rabbits, birds, deer, dogs and humans. The bite may cause a local reaction like that of a spider, with a small amount of local redness, itching and slight swelling. Sometimes there is no local reaction.  Most tick bites are harmless. But some ticks carry diseases, such as Lyme disease or [place] spotted fever. These can be passed to people at the time of the bite. Lyme disease is of greatest concern. Right now you have no symptoms of Lyme disease or other serious reaction to the bite. It is important to watch for the warning signs, which could appear weeks to months after the tick bite.  Home care  The following guidelines can help you care for your bite at home:    If itching is a problem, avoid tight clothing and anything that heats up your skin. This includes hot showers or baths and direct sunlight. This often makes the itching worse.    An ice pack will reduce local areas of redness and itching. Make your own ice pack by putting ice cubes in a zip-top plastic bag and wrapping it in a thin towel. Over-the-counter creams containing benzocaine may help with itching.    You can use an antihistamine with diphenhydramine if your doctor did not give you another antihistamine. This medicine may be used to reduce itching if large areas of the skin are involved. It is available at drugstores and grocery stores. If symptoms continue, talk with your doctor or pharmacist about other over-the counter medicines that may be helpful.    Your doctor may prescribe antibiotics to reduce your risk of getting Lyme disease. It is very important that you take them exactly as directed until they are completely finished.  Follow-up care  Follow up with your healthcare provider, or as advised.  Call 911  Call 911 if any of these occur:    Irregular or rapid heartbeat    Numbness, tingling, or weakness in the arms  or legs  When to seek medical advice  Call your healthcare provider right away if any of these occur:  Signs of local infection. Watch for these during the next few days:    Increasing redness around the bite site    Increased pain or swelling    Fever over 100.4 F (38 C), or as directed by your healthcare provider    Fluid draining from the bite area  Signs of tick-related disease. Watch for these over the next few weeks to months:    Circular, red, ring-like rash appears at the bite area within 1 to 3 weeks    Tiredness, fever or chills, nausea or vomiting    Neck pain or stiffness, headache, or confusion    Muscle or bone aches    Joint pain or swelling, especially in the knee    Weakness on one side of the face  Date Last Reviewed: 10/1/2016    8609-0367 [employer]. [address] [place] [address], [place] [address]. All rights reserved. This information is not intended as a substitute for professional medical care. Always follow your healthcare professional's instructions.

## 2021-02-22 DIAGNOSIS — I10 BENIGN ESSENTIAL HYPERTENSION: ICD-10-CM

## 2021-02-22 NOTE — TELEPHONE ENCOUNTER
"Requested Prescriptions   Pending Prescriptions Disp Refills     lisinopril (ZESTRIL) 10 MG tablet 90 tablet 3     Sig: Take 1 tablet (10 mg) by mouth daily       ACE Inhibitors (Including Combos) Protocol Passed - 2/22/2021 10:31 AM        Passed - Blood pressure under 140/90 in past 12 months     BP Readings from Last 3 Encounters:   10/07/20 124/78   02/24/20 126/84   12/29/19 (!) 135/91                 Passed - Recent (12 mo) or future (30 days) visit within the authorizing provider's specialty     Patient has had an office visit with the authorizing provider or a provider within the authorizing providers department within the previous 12 mos or has a future within next 30 days. See \"Patient Info\" tab in inbasket, or \"Choose Columns\" in Meds & Orders section of the refill encounter.              Passed - Medication is active on med list        Passed - Patient is age 18 or older        Passed - Normal serum creatinine on file in past 12 months     Recent Labs   Lab Test 02/24/20  1321   CR 1.18       Ok to refill medication if creatinine is low          Passed - Normal serum potassium on file in past 12 months     Recent Labs   Lab Test 02/24/20  1321   POTASSIUM 4.7                  "

## 2021-02-25 RX ORDER — LISINOPRIL 10 MG/1
10 TABLET ORAL DAILY
Qty: 90 TABLET | Refills: 0 | Status: SHIPPED | OUTPATIENT
Start: 2021-02-25 | End: 2021-03-02

## 2021-02-25 NOTE — TELEPHONE ENCOUNTER
Medication is being filled for 1 time refill only due to:  Patient needs to be seen because it has been more than one year since last visit.  Kacie Ureña RN

## 2021-03-02 ENCOUNTER — OFFICE VISIT (OUTPATIENT)
Dept: FAMILY MEDICINE | Facility: CLINIC | Age: 51
End: 2021-03-02
Payer: COMMERCIAL

## 2021-03-02 VITALS
RESPIRATION RATE: 14 BRPM | HEIGHT: 75 IN | HEART RATE: 62 BPM | TEMPERATURE: 97.5 F | OXYGEN SATURATION: 95 % | BODY MASS INDEX: 30.91 KG/M2 | WEIGHT: 248.6 LBS | SYSTOLIC BLOOD PRESSURE: 122 MMHG | DIASTOLIC BLOOD PRESSURE: 84 MMHG

## 2021-03-02 DIAGNOSIS — E78.2 MIXED HYPERLIPIDEMIA: ICD-10-CM

## 2021-03-02 DIAGNOSIS — I10 BENIGN ESSENTIAL HYPERTENSION: Primary | ICD-10-CM

## 2021-03-02 DIAGNOSIS — R73.03 PREDIABETES: ICD-10-CM

## 2021-03-02 DIAGNOSIS — Z12.11 SCREENING FOR MALIGNANT NEOPLASM OF COLON: ICD-10-CM

## 2021-03-02 DIAGNOSIS — Z71.89 ADVANCED DIRECTIVES, COUNSELING/DISCUSSION: ICD-10-CM

## 2021-03-02 DIAGNOSIS — E66.9 NON MORBID OBESITY, UNSPECIFIED OBESITY TYPE: ICD-10-CM

## 2021-03-02 LAB
ANION GAP SERPL CALCULATED.3IONS-SCNC: 3 MMOL/L (ref 3–14)
BUN SERPL-MCNC: 18 MG/DL (ref 7–30)
CALCIUM SERPL-MCNC: 9.1 MG/DL (ref 8.5–10.1)
CHLORIDE SERPL-SCNC: 106 MMOL/L (ref 94–109)
CHOLEST SERPL-MCNC: 199 MG/DL
CO2 SERPL-SCNC: 28 MMOL/L (ref 20–32)
CREAT SERPL-MCNC: 1.19 MG/DL (ref 0.66–1.25)
GFR SERPL CREATININE-BSD FRML MDRD: 70 ML/MIN/{1.73_M2}
GLUCOSE SERPL-MCNC: 102 MG/DL (ref 70–99)
HBA1C MFR BLD: 5.4 % (ref 0–5.6)
HDLC SERPL-MCNC: 38 MG/DL
LDLC SERPL CALC-MCNC: 124 MG/DL
NONHDLC SERPL-MCNC: 161 MG/DL
POTASSIUM SERPL-SCNC: 4.3 MMOL/L (ref 3.4–5.3)
SODIUM SERPL-SCNC: 137 MMOL/L (ref 133–144)
TRIGL SERPL-MCNC: 185 MG/DL

## 2021-03-02 PROCEDURE — 80048 BASIC METABOLIC PNL TOTAL CA: CPT | Performed by: FAMILY MEDICINE

## 2021-03-02 PROCEDURE — 36415 COLL VENOUS BLD VENIPUNCTURE: CPT | Performed by: FAMILY MEDICINE

## 2021-03-02 PROCEDURE — 83036 HEMOGLOBIN GLYCOSYLATED A1C: CPT | Performed by: FAMILY MEDICINE

## 2021-03-02 PROCEDURE — 99214 OFFICE O/P EST MOD 30 MIN: CPT | Performed by: FAMILY MEDICINE

## 2021-03-02 PROCEDURE — 80061 LIPID PANEL: CPT | Performed by: FAMILY MEDICINE

## 2021-03-02 RX ORDER — LISINOPRIL 10 MG/1
10 TABLET ORAL DAILY
Qty: 90 TABLET | Refills: 3 | Status: SHIPPED | OUTPATIENT
Start: 2021-03-02 | End: 2021-06-03

## 2021-03-02 ASSESSMENT — MIFFLIN-ST. JEOR: SCORE: 2060.33

## 2021-03-02 NOTE — PATIENT INSTRUCTIONS
You will be contacted in 1-2 days for results of your lab tests.    Be consistent with low salt, low trans fat and low saturated fat diet.  Eat food rich in omega-3-fatty acids as you tolerate. (salmon, olive oil)  Eat 5 cups of vegetables, fruits and whole grains per day.  Limit starchy food (white rice, white bread, white pasta, white potatoes) to less than a cup per meal.  Minimize sweets, junk food and fastfood. Limit soda beverages to one serving per day; best to avoid it altogether though.    Exercise: moderate intensity sustained for at least 30 mins per episode, goal of 150 mins per week at least  Combine cardiovascular and resistance exercises.  These exercise recommendations are in addition to your daily activity at work or home.  Work on losing weight.    Schedule colonoscopy for screening for colon cancer at your soonest convenience.    You may get the Shingrix vaccine for shingles if you desire, and after you verify with insurance how they cover the vaccine.    Reconsider your deferment of the flu vaccine as you are at risk for complications if you get infected by the flu.    Patient Education     Eating Heart-Healthy Foods  Eating has a big impact on your heart health. In fact, eating healthier can improve several of your heart risks at once. For instance, it helps you manage weight, cholesterol, and blood pressure. Here are ideas to help you make heart-healthy changes without giving up all the foods and flavors you love.   Getting started    Talk with your healthcare provider about eating plans, such as the DASH or Mediterranean diet. You may also be referred to a dietitian.    Change a few things at a time. Give yourself time to get used to a few eating changes before adding more.    Work to create a tasty, healthy eating plan that you can stick to for the rest of your life.    Goals for healthy eating  Below are some tips to improve your eating habits:     Limit saturated fats and trans fats.  Saturated fats raise your levels of cholesterol, so keep these fats to a minimum. They are found in foods such as fatty meats, whole milk, cheese, and palm and coconut oils. Avoid trans fats because they lower good cholesterol as well as raise bad cholesterol. Trans fats are most often found in processed foods, such as pastries, cookies, pies, muffins, fried foods, stick margarines, and shortening.    Reduce how much sodium (salt) you have. Eating too much salt may increase your blood pressure. Limit your sodium intake to 2,300 milligrams (mg) per day (the amount in 1 teaspoon of salt), or less if your healthcare provider recommends it. Dining out less often and eating fewer processed foods are two great ways to decrease the amount of salt you consume. At home, flavor your foods with other spices and herbs instead of salt.    Managing calories. A calorie is a unit of energy. Your body burns calories for fuel, but if you eat more calories than your body burns, the extras are stored as fat. Your healthcare provider can help you create a diet plan to manage your calories. This will likely include eating healthier foods and getting regular exercise. To help you track your progress, keep a diary to record what you eat and how often you exercise.  Choose the right foods  Aim to make these foods staples of your diet. If you have diabetes, you may have different recommendations than what is listed here:     Fruits and vegetables provide plenty of nutrients without a lot of calories. At meals, fill half your plate with these foods. Choose between fresh, frozen, canned, or dried without added sauces, salt, or sugars. Split the other half of your plate between whole grains and lean protein.    Whole grains are high in fiber and rich in vitamins and nutrients. Good choices include whole wheat bread, pasta, oats, and brown rice. Make at least half of your grains whole grains.    Lean proteins give you nutrition with less fat.  Good choices include fish, skinless chicken and turkey, and beans. Draining the fat from cooked ground meat is another way to reduce the amount of fat you eat.    Low-fat and nonfat dairy provide nutrients without a lot of fat. Try low-fat or nonfat milk, cheese, or yogurt.    Healthy fats can be good for you in small amounts. These are unsaturated fats, such as olive oil, nuts, and fish. Try to have at least 2 servings per week of fatty fish, such as salmon, sardines, mackerel, rainbow trout, and albacore tuna. These contain omega-3 fatty acids, which are good for your heart. Flaxseed and walnuts are other sources of heart-healthy fats.  More on heart-healthy eating  Read food labels  Healthy eating starts at the grocery store. Be sure to pay attention to food labels on packaged foods. Look for products that are high in fiber and protein, and low in saturated fat, added sugars, and sodium. Avoid products that contain trans fat. And pay close attention to serving size. For instance, if you plan to eat two servings, double all the numbers on the label.   Prepare food right  A key part of healthy cooking is cutting down on added fat, sugar and salt. Look on the internet for lower-fat, lower-sodium recipes without a lot of added sugars. Also try these tips:     Remove fat from meat and skin from poultry before cooking.    Skim fat from the surface of soups and sauces.    Broil, roast, boil, bake, steam, grill, or microwave food without added fats.    Choose ingredients that spice up your food without adding calories, fat, sugar, or sodium. Try these items: horseradish, hot sauce, lemon, mustard, nonfat salad dressings, and vinegar. Small amounts of olive oil-based vinaigrettes are OK, too. For salt-free herbs and spices, try basil, cilantro, cinnamon, cumin, paprika, pepper, and rosemary.  Amadou last reviewed this educational content on 7/1/2020 2000-2020 [employer]. [address] [place] [address],  [place] [address]. All rights reserved. This information is not intended as a substitute for professional medical care. Always follow your healthcare professional's instructions.

## 2021-03-02 NOTE — PROGRESS NOTES
"    Assessment & Plan     Benign essential hypertension  Controlled.  Low salt, low fat diet.   Exercise as tolerated.  Take meds as prescribed; call if with side effects.   - lisinopril (ZESTRIL) 10 MG tablet  Dispense: 90 tablet; Refill: 3  - Basic metabolic panel    Mixed hyperlipidemia  Reinforced heart healthy lifestyle.   - Lipid panel reflex to direct LDL Fasting    Prediabetes  Advised carb-control and weight management to prevent diabetes.  - Basic metabolic panel  - Hemoglobin A1c    Non morbid obesity, unspecified obesity type  Patient was advised healthy diet recommendations.  Patient was advised weekly exercise recommendations and goals.      Advanced directives, counseling/discussion  Discussed benefits of having advanced health care directives in place.  Patient was given handout/info about getting this done.    Screening for malignant neoplasm of colon  - GASTROENTEROLOGY ADULT REF PROCEDURE ONLY       BMI:   Estimated body mass index is 31.49 kg/m  as calculated from the following:    Height as of this encounter: 1.892 m (6' 2.5\").    Weight as of this encounter: 112.8 kg (248 lb 9.6 oz).   Weight management plan: Discussed healthy diet and exercise guidelines    Patient Instructions   You will be contacted in 1-2 days for results of your lab tests.    Be consistent with low salt, low trans fat and low saturated fat diet.  Eat food rich in omega-3-fatty acids as you tolerate. (salmon, olive oil)  Eat 5 cups of vegetables, fruits and whole grains per day.  Limit starchy food (white rice, white bread, white pasta, white potatoes) to less than a cup per meal.  Minimize sweets, junk food and fastfood. Limit soda beverages to one serving per day; best to avoid it altogether though.    Exercise: moderate intensity sustained for at least 30 mins per episode, goal of 150 mins per week at least  Combine cardiovascular and resistance exercises.  These exercise recommendations are in addition to your daily " activity at work or home.  Work on losing weight.    Schedule colonoscopy for screening for colon cancer at your soonest convenience.    You may get the Shingrix vaccine for shingles if you desire, and after you verify with insurance how they cover the vaccine.    Reconsider your deferment of the flu vaccine as you are at risk for complications if you get infected by the flu.    Patient Education     Eating Heart-Healthy Foods  Eating has a big impact on your heart health. In fact, eating healthier can improve several of your heart risks at once. For instance, it helps you manage weight, cholesterol, and blood pressure. Here are ideas to help you make heart-healthy changes without giving up all the foods and flavors you love.   Getting started    Talk with your healthcare provider about eating plans, such as the DASH or Mediterranean diet. You may also be referred to a dietitian.    Change a few things at a time. Give yourself time to get used to a few eating changes before adding more.    Work to create a tasty, healthy eating plan that you can stick to for the rest of your life.    Goals for healthy eating  Below are some tips to improve your eating habits:     Limit saturated fats and trans fats. Saturated fats raise your levels of cholesterol, so keep these fats to a minimum. They are found in foods such as fatty meats, whole milk, cheese, and palm and coconut oils. Avoid trans fats because they lower good cholesterol as well as raise bad cholesterol. Trans fats are most often found in processed foods, such as pastries, cookies, pies, muffins, fried foods, stick margarines, and shortening.    Reduce how much sodium (salt) you have. Eating too much salt may increase your blood pressure. Limit your sodium intake to 2,300 milligrams (mg) per day (the amount in 1 teaspoon of salt), or less if your healthcare provider recommends it. Dining out less often and eating fewer processed foods are two great ways to decrease  the amount of salt you consume. At home, flavor your foods with other spices and herbs instead of salt.    Managing calories. A calorie is a unit of energy. Your body burns calories for fuel, but if you eat more calories than your body burns, the extras are stored as fat. Your healthcare provider can help you create a diet plan to manage your calories. This will likely include eating healthier foods and getting regular exercise. To help you track your progress, keep a diary to record what you eat and how often you exercise.  Choose the right foods  Aim to make these foods staples of your diet. If you have diabetes, you may have different recommendations than what is listed here:     Fruits and vegetables provide plenty of nutrients without a lot of calories. At meals, fill half your plate with these foods. Choose between fresh, frozen, canned, or dried without added sauces, salt, or sugars. Split the other half of your plate between whole grains and lean protein.    Whole grains are high in fiber and rich in vitamins and nutrients. Good choices include whole wheat bread, pasta, oats, and brown rice. Make at least half of your grains whole grains.    Lean proteins give you nutrition with less fat. Good choices include fish, skinless chicken and turkey, and beans. Draining the fat from cooked ground meat is another way to reduce the amount of fat you eat.    Low-fat and nonfat dairy provide nutrients without a lot of fat. Try low-fat or nonfat milk, cheese, or yogurt.    Healthy fats can be good for you in small amounts. These are unsaturated fats, such as olive oil, nuts, and fish. Try to have at least 2 servings per week of fatty fish, such as salmon, sardines, mackerel, rainbow trout, and albacore tuna. These contain omega-3 fatty acids, which are good for your heart. Flaxseed and walnuts are other sources of heart-healthy fats.  More on heart-healthy eating  Read food labels  Healthy eating starts at the grocery  store. Be sure to pay attention to food labels on packaged foods. Look for products that are high in fiber and protein, and low in saturated fat, added sugars, and sodium. Avoid products that contain trans fat. And pay close attention to serving size. For instance, if you plan to eat two servings, double all the numbers on the label.   Prepare food right  A key part of healthy cooking is cutting down on added fat, sugar and salt. Look on the internet for lower-fat, lower-sodium recipes without a lot of added sugars. Also try these tips:     Remove fat from meat and skin from poultry before cooking.    Skim fat from the surface of soups and sauces.    Broil, roast, boil, bake, steam, grill, or microwave food without added fats.    Choose ingredients that spice up your food without adding calories, fat, sugar, or sodium. Try these items: horseradish, hot sauce, lemon, mustard, nonfat salad dressings, and vinegar. Small amounts of olive oil-based vinaigrettes are OK, too. For salt-free herbs and spices, try basil, cilantro, cinnamon, cumin, paprika, pepper, and rosemary.  [employer] last reviewed this educational content on 7/1/2020 2000-2020 [employer]. [address] [place] [address]. All rights reserved. This information is not intended as a substitute for professional medical care. Always follow your healthcare professional's instructions.               Return in about 1 year (around 3/2/2022) for or as needed .    Steven Lund MD  [facility]    Siri Magallanes is a 50 year old who presents for the following health issues  Chief Complaint   Patient presents with     Hypertension     Pt here for f/u on b/p.  He is fasting for lab work today.     Flu Shot     declined     Health Maintenance     colonoscopy ordered       HPI     Hypertension Follow-up      Do you check your blood pressure regularly outside of the clinic? No     Are you following a low salt  diet? No, not currently    Are your blood pressures ever more than 140 on the top number (systolic) OR more   than 90 on the bottom number (diastolic), for example 140/90? Does not check.    Denies chest pain, dyspnea, HA, BOV, dizziness or urinary changes.      How many servings of fruits and vegetables do you eat daily?  0-1    On average, how many sweetened beverages do you drink each day (Examples: soda, juice, sweet tea, etc.  Do NOT count diet or artificially sweetened beverages)?   1    How many days per week do you exercise enough to make your heart beat faster? 3    How many minutes a day do you exercise enough to make your heart beat faster? 15 minutes, walking    How many days per week do you miss taking your medication? 0    Medication Followup of lisinopril    Taking Medication as prescribed: yes    Side Effects:  None    Medication Helping Symptoms:  yes     Patient Active Problem List   Diagnosis     Hyperlipidemia     HYPERLIPIDEMIA LDL GOAL <160     Autosensitization dermatitis     ROSALINA (obstructive sleep apnea)     Benign essential hypertension     Prediabetes     Non morbid obesity, unspecified obesity type     History reviewed. No pertinent surgical history.    Social History     Tobacco Use     Smoking status: Never Smoker     Smokeless tobacco: Never Used   Substance Use Topics     Alcohol use: Yes     Alcohol/week: 0.0 standard drinks     Comment: few per week     Family History   Problem Relation Age of Onset     Diabetes Mother 83     Unknown/Adopted Maternal Grandfather      Unknown/Adopted Paternal Grandmother      Cerebrovascular Disease Paternal Grandfather      Psychotic Disorder Brother         depression     Psychotic Disorder Daughter         ADHD     Psychotic Disorder Brother         depression and possible schizophrenia     Psychotic Disorder Brother         depression     Cardiac Sudden Death Brother          at age 57- cardiac arrest     Asthma Brother          Current  "Outpatient Medications   Medication Sig Dispense Refill     lisinopril (ZESTRIL) 10 MG tablet Take 1 tablet (10 mg) by mouth daily Due for office visit before further refills. 90 tablet 0     ORDER FOR MATT Magallanes was set up on [facility]  Pressure: set 7  Mask of choice: Wisp  Size: Large cushion    Follow up appointment is scheduled: April 13, 2015       No Known Allergies    Review of Systems   Constitutional, HEENT, cardiovascular, pulmonary, GI, , musculoskeletal, neuro, skin, endocrine and psych systems are negative, except as otherwise noted.      Objective    /84   Pulse 62   Temp 97.5  F (36.4  C) (Tympanic)   Resp 14   Ht 1.892 m (6' 2.5\")   Wt 112.8 kg (248 lb 9.6 oz)   SpO2 95%   BMI 31.49 kg/m    Body mass index is 31.49 kg/m .  Physical Exam   GENERAL:  alert and no distress, ambulatory w/o assist  NECK: no tenderness, no adenopathy,  Thyroid not enlarged  RESP: lungs clear to auscultation - no rales, no rhonchi, no wheezes  CV: regular rates and rhythm, no murmur  MS: no edema  SKIN: no suspicious lesions, no rashes  NEURO: strength and tone- normal, sensory exam- grossly normal, mentation- intact, speech- normal, reflexes- symmetric  ABD:  nontender    No results found for any visits on 03/02/21.      "

## 2021-03-02 NOTE — LETTER
March 2, 2021      Elpidio Swift  [address]  [place] [address]        Dear ,    We are writing to inform you of your test results.    Cholesterol levels are still slightly high. Blood sugar is borderline high for fasting level. A1c is in normal range.     Follow healthy lifestyle recommendations given on visit.   Tests can be repeated in a year.     Resulted Orders   Basic metabolic panel   Result Value Ref Range    Sodium 137 133 - 144 mmol/L    Potassium 4.3 3.4 - 5.3 mmol/L    Chloride 106 94 - 109 mmol/L    Carbon Dioxide 28 20 - 32 mmol/L    Anion Gap 3 3 - 14 mmol/L    Glucose 102 (H) 70 - 99 mg/dL      Comment:      Fasting specimen    Urea Nitrogen 18 7 - 30 mg/dL    Creatinine 1.19 0.66 - 1.25 mg/dL    GFR Estimate 70 >60 mL/min/[1.73_m2]      Comment:      Non  GFR Calc  Starting 12/18/2018, serum creatinine based estimated GFR (eGFR) will be   calculated using the Chronic Kidney Disease Epidemiology Collaboration   (CKD-EPI) equation.      GFR Estimate If Black 81 >60 mL/min/[1.73_m2]      Comment:       GFR Calc  Starting 12/18/2018, serum creatinine based estimated GFR (eGFR) will be   calculated using the Chronic Kidney Disease Epidemiology Collaboration   (CKD-EPI) equation.      Calcium 9.1 8.5 - 10.1 mg/dL   Lipid panel reflex to direct LDL Fasting   Result Value Ref Range    Cholesterol 199 <200 mg/dL    Triglycerides 185 (H) <150 mg/dL      Comment:      Borderline high:  150-199 mg/dl  High:             200-499 mg/dl  Very high:       >499 mg/dl  Fasting specimen      HDL Cholesterol 38 (L) >39 mg/dL    LDL Cholesterol Calculated 124 (H) <100 mg/dL      Comment:      Above desirable:  100-129 mg/dl  Borderline High:  130-159 mg/dL  High:             160-189 mg/dL  Very high:       >189 mg/dl      Non HDL Cholesterol 161 (H) <130 mg/dL      Comment:      Above Desirable:  130-159 mg/dl  Borderline high:  160-189 mg/dl  High:             190-219  mg/dl  Very high:       >219 mg/dl     Hemoglobin A1c   Result Value Ref Range    Hemoglobin A1C 5.4 0 - 5.6 %      Comment:      Normal <5.7% Prediabetes 5.7-6.4%  Diabetes 6.5% or higher - adopted from ADA   consensus guidelines.         If you have any questions or concerns, please call the clinic at the number listed above.       Sincerely,      Steven Lund MD

## 2021-04-21 DIAGNOSIS — Z11.59 ENCOUNTER FOR SCREENING FOR OTHER VIRAL DISEASES: ICD-10-CM

## 2021-04-28 RX ORDER — FLUTICASONE PROPIONATE 50 MCG
1 SPRAY, SUSPENSION (ML) NASAL DAILY
COMMUNITY
End: 2024-07-29

## 2021-04-28 RX ORDER — CETIRIZINE HYDROCHLORIDE 10 MG/1
10 TABLET ORAL DAILY
COMMUNITY
End: 2024-07-29

## 2021-04-29 ENCOUNTER — ANESTHESIA EVENT (OUTPATIENT)
Dept: GASTROENTEROLOGY | Facility: CLINIC | Age: 51
End: 2021-04-29
Payer: COMMERCIAL

## 2021-04-29 NOTE — ANESTHESIA PREPROCEDURE EVALUATION
Anesthesia Pre-Procedure Evaluation    Patient: Elpidio Swift   MRN: [id number] : 1970        Preoperative Diagnosis: Encounter for screening for malignant neoplasm of colon [Z12.11]   Procedure : Procedure(s):  COLONOSCOPY     No past medical history on file.   No past surgical history on file.   No Known Allergies   Social History     Tobacco Use     Smoking status: Never Smoker     Smokeless tobacco: Never Used   Substance Use Topics     Alcohol use: Yes     Alcohol/week: 0.0 standard drinks     Comment: few per week      Wt Readings from Last 1 Encounters:   21 112.8 kg (248 lb 9.6 oz)        Anesthesia Evaluation   Pt has had prior anesthetic.         ROS/MED HX  ENT/Pulmonary:     (+) sleep apnea, ROSALINA risk factors,     Neurologic:  - neg neurologic ROS     Cardiovascular:     (+) Dyslipidemia hypertension-----    METS/Exercise Tolerance:     Hematologic:  - neg hematologic  ROS     Musculoskeletal:  - neg musculoskeletal ROS     GI/Hepatic:  - neg GI/hepatic ROS     Renal/Genitourinary:  - neg Renal ROS     Endo:     (+) Obesity,     Psychiatric/Substance Use:  - neg psychiatric ROS     Infectious Disease:  - neg infectious disease ROS     Malignancy:  - neg malignancy ROS     Other:  - neg other ROS          Physical Exam    Airway        Mallampati: II   TM distance: > 3 FB   Neck ROM: full     Respiratory Devices and Support         Dental  no notable dental history         Cardiovascular   cardiovascular exam normal          Pulmonary                   OUTSIDE LABS:  CBC: No results found for: WBC, HGB, HCT, PLT  BMP:   Lab Results   Component Value Date     2021     2020    POTASSIUM 4.3 2021    POTASSIUM 4.7 2020    CHLORIDE 106 2021    CHLORIDE 106 2020    CO2 28 2021    CO2 29 2020    BUN 18 2021    BUN 16 2020    CR 1.19 2021    CR 1.18 2020     (H) 2021    GLC 95 2020     COAGS: No  results found for: PTT, INR, FIBR  POC: No results found for: BGM, HCG, HCGS  HEPATIC:   Lab Results   Component Value Date    ALT 56 02/12/2019     OTHER:   Lab Results   Component Value Date    A1C 5.4 03/02/2021    SHANTELL 9.1 03/02/2021       Anesthesia Plan    ASA Status:  2      Anesthesia Type: MAC.              Consents    Anesthesia Plan(s) and associated risks, benefits, and realistic alternatives discussed. Questions answered and patient/representative(s) expressed understanding.     - Discussed with:  Patient         Postoperative Care            Comments:                Carlitos Regalado CRNA, APRN CRNA

## 2021-04-30 DIAGNOSIS — Z11.59 ENCOUNTER FOR SCREENING FOR OTHER VIRAL DISEASES: ICD-10-CM

## 2021-04-30 LAB
LABORATORY COMMENT REPORT: NORMAL
SARS-COV-2 RNA RESP QL NAA+PROBE: NEGATIVE
SARS-COV-2 RNA RESP QL NAA+PROBE: NORMAL
SPECIMEN SOURCE: NORMAL
SPECIMEN SOURCE: NORMAL

## 2021-04-30 PROCEDURE — U0005 INFEC AGEN DETEC AMPLI PROBE: HCPCS | Performed by: SURGERY

## 2021-04-30 PROCEDURE — U0003 INFECTIOUS AGENT DETECTION BY NUCLEIC ACID (DNA OR RNA); SEVERE ACUTE RESPIRATORY SYNDROME CORONAVIRUS 2 (SARS-COV-2) (CORONAVIRUS DISEASE [COVID-19]), AMPLIFIED PROBE TECHNIQUE, MAKING USE OF HIGH THROUGHPUT TECHNOLOGIES AS DESCRIBED BY CMS-2020-01-R: HCPCS | Performed by: SURGERY

## 2021-05-03 ENCOUNTER — ANESTHESIA (OUTPATIENT)
Dept: GASTROENTEROLOGY | Facility: CLINIC | Age: 51
End: 2021-05-03
Payer: COMMERCIAL

## 2021-05-03 ENCOUNTER — HOSPITAL ENCOUNTER (OUTPATIENT)
Facility: CLINIC | Age: 51
Discharge: HOME | End: 2021-05-03
Attending: SURGERY | Admitting: SURGERY
Payer: COMMERCIAL

## 2021-05-03 VITALS
HEART RATE: 67 BPM | TEMPERATURE: 97.7 F | OXYGEN SATURATION: 94 % | SYSTOLIC BLOOD PRESSURE: 127 MMHG | WEIGHT: 236 LBS | DIASTOLIC BLOOD PRESSURE: 89 MMHG | RESPIRATION RATE: 14 BRPM | HEIGHT: 75 IN | BODY MASS INDEX: 29.34 KG/M2

## 2021-05-03 LAB — COLONOSCOPY: NORMAL

## 2021-05-03 PROCEDURE — 370N000017 HC ANESTHESIA TECHNICAL FEE, PER MIN: Performed by: SURGERY

## 2021-05-03 PROCEDURE — 45378 DIAGNOSTIC COLONOSCOPY: CPT | Performed by: SURGERY

## 2021-05-03 PROCEDURE — 250N000011 HC RX IP 250 OP 636: Performed by: NURSE ANESTHETIST, CERTIFIED REGISTERED

## 2021-05-03 PROCEDURE — 250N000009 HC RX 250: Performed by: SURGERY

## 2021-05-03 PROCEDURE — G0121 COLON CA SCRN NOT HI RSK IND: HCPCS | Performed by: SURGERY

## 2021-05-03 PROCEDURE — 258N000003 HC RX IP 258 OP 636: Performed by: SURGERY

## 2021-05-03 PROCEDURE — 250N000009 HC RX 250: Performed by: NURSE ANESTHETIST, CERTIFIED REGISTERED

## 2021-05-03 RX ORDER — LIDOCAINE 40 MG/G
CREAM TOPICAL
Status: DISCONTINUED | OUTPATIENT
Start: 2021-05-03 | End: 2021-05-03 | Stop reason: HOSPADM

## 2021-05-03 RX ORDER — SODIUM CHLORIDE, SODIUM LACTATE, POTASSIUM CHLORIDE, CALCIUM CHLORIDE 600; 310; 30; 20 MG/100ML; MG/100ML; MG/100ML; MG/100ML
INJECTION, SOLUTION INTRAVENOUS CONTINUOUS
Status: DISCONTINUED | OUTPATIENT
Start: 2021-05-03 | End: 2021-05-03 | Stop reason: HOSPADM

## 2021-05-03 RX ORDER — PROPOFOL 10 MG/ML
INJECTION, EMULSION INTRAVENOUS CONTINUOUS PRN
Status: DISCONTINUED | OUTPATIENT
Start: 2021-05-03 | End: 2021-05-03

## 2021-05-03 RX ORDER — ONDANSETRON 2 MG/ML
4 INJECTION INTRAMUSCULAR; INTRAVENOUS
Status: DISCONTINUED | OUTPATIENT
Start: 2021-05-03 | End: 2021-05-03 | Stop reason: HOSPADM

## 2021-05-03 RX ORDER — PROPOFOL 10 MG/ML
INJECTION, EMULSION INTRAVENOUS PRN
Status: DISCONTINUED | OUTPATIENT
Start: 2021-05-03 | End: 2021-05-03

## 2021-05-03 RX ADMIN — LIDOCAINE HYDROCHLORIDE 5 ML: 10 INJECTION, SOLUTION EPIDURAL; INFILTRATION; INTRACAUDAL; PERINEURAL at 08:50

## 2021-05-03 RX ADMIN — PROPOFOL 50 MG: 10 INJECTION, EMULSION INTRAVENOUS at 08:50

## 2021-05-03 RX ADMIN — PROPOFOL 200 MCG/KG/MIN: 10 INJECTION, EMULSION INTRAVENOUS at 08:50

## 2021-05-03 RX ADMIN — SODIUM CHLORIDE, POTASSIUM CHLORIDE, SODIUM LACTATE AND CALCIUM CHLORIDE: 600; 310; 30; 20 INJECTION, SOLUTION INTRAVENOUS at 08:39

## 2021-05-03 ASSESSMENT — MIFFLIN-ST. JEOR: SCORE: 1998.18

## 2021-05-03 NOTE — ANESTHESIA CARE TRANSFER NOTE
Patient: Elpidio Swift    Procedure(s):  COLONOSCOPY    Diagnosis: Encounter for screening for malignant neoplasm of colon [Z12.11]  Diagnosis Additional Information: No value filed.    Anesthesia Type:   MAC     Note:      Level of Consciousness: drowsy      Independent Airway: airway patency satisfactory and stable    Vital Signs Stable: post-procedure vital signs reviewed and stable  Report to RN Given: handoff report given  Patient transferred to: Phase II    Handoff Report: Identifed the Patient, Identified the Reponsible Provider, Reviewed the pertinent medical history, Discussed the surgical course, Reviewed Intra-OP anesthesia mangement and issues during anesthesia, Set expectations for post-procedure period and Allowed opportunity for questions and acknowledgement of understanding      Vitals: (Last set prior to Anesthesia Care Transfer)  CRNA VITALS  5/3/2021 0834 - 5/3/2021 0904      5/3/2021             Pulse:  74    Ht Rate:  73    SpO2:  91 %        Electronically Signed By: AZAR Castro CRNA  May 3, 2021  9:04 AM

## 2021-05-03 NOTE — H&P
"51 year old year old male here for colonoscopy for screening.    Patient Active Problem List   Diagnosis     Hyperlipidemia     HYPERLIPIDEMIA LDL GOAL <160     Autosensitization dermatitis     ROSALINA (obstructive sleep apnea)     Benign essential hypertension     Prediabetes     Non morbid obesity, unspecified obesity type     Advanced directives, counseling/discussion       No past medical history on file.    No past surgical history on file.    @[facility]@    No current outpatient medications on file.       No Known Allergies    Pt reports that he has never smoked. He has never used smokeless tobacco. He reports current alcohol use. He reports that he does not use drugs.    Exam:  BP (!) 134/95 (BP Location: Right arm)   Pulse 87   Temp 97.7  F (36.5  C) (Oral)   Resp 18   Ht 1.892 m (6' 2.5\")   Wt 107 kg (236 lb)   SpO2 96%   BMI 29.90 kg/m      Awake, Alert OX3  Lungs - CTA bilaterally  CV - RRR, no murmurs, distal pulses intact  Abd - soft, non-distended, non-tender, +BS  Extr - No cyanosis or edema    A/P 51 year old year old male in need of colonoscopy for screening. Risks, benefits, alternatives, and complications were discussed including the possibility of perforation and the patient agreed to proceed    Rambo Redd MD     "

## 2021-05-03 NOTE — ANESTHESIA POSTPROCEDURE EVALUATION
Patient: Elpidio Swift    Procedure(s):  COLONOSCOPY    Diagnosis:Encounter for screening for malignant neoplasm of colon [Z12.11]  Diagnosis Additional Information: No value filed.    Anesthesia Type:  MAC    Note:  Disposition: Outpatient   Postop Pain Control: Uneventful            Sign Out: Well controlled pain   PONV: No   Neuro/Psych: Uneventful            Sign Out: Acceptable/Baseline neuro status   Airway/Respiratory: Uneventful            Sign Out: Acceptable/Baseline resp. status   CV/Hemodynamics: Uneventful            Sign Out: Acceptable CV status; No obvious hypovolemia; No obvious fluid overload   Other NRE: NONE   DID A NON-ROUTINE EVENT OCCUR? No           Last vitals:  Vitals:    05/03/21 0700   BP: (!) 134/95   Pulse: 87   Resp: 18   Temp: 36.5  C (97.7  F)   SpO2: 96%       Last vitals prior to Anesthesia Care Transfer:  CRNA VITALS  5/3/2021 0834 - 5/3/2021 0905      5/3/2021             Pulse:  74    Ht Rate:  73    SpO2:  91 %          Electronically Signed By: AZAR Castro CRNA  May 3, 2021  9:05 AM

## 2021-06-01 DIAGNOSIS — I10 BENIGN ESSENTIAL HYPERTENSION: ICD-10-CM

## 2021-06-03 RX ORDER — LISINOPRIL 10 MG/1
TABLET ORAL
Qty: 90 TABLET | Refills: 2 | Status: SHIPPED | OUTPATIENT
Start: 2021-06-03 | End: 2022-03-28

## 2021-06-03 NOTE — TELEPHONE ENCOUNTER
"Prescription approved per [facility] Refill Protocol.  Kacie Ureña RN    Requested Prescriptions   Pending Prescriptions Disp Refills     lisinopril (ZESTRIL) 10 MG tablet [Pharmacy Med Name: LISINOPRIL 10MG TABLETS] 90 tablet 3     Sig: TAKE 1 TABLET(10 MG) BY MOUTH DAILY       ACE Inhibitors (Including Combos) Protocol Passed - 6/1/2021  9:05 AM        Passed - Blood pressure under 140/90 in past 12 months     BP Readings from Last 3 Encounters:   05/03/21 127/89   03/02/21 122/84   10/07/20 124/78                 Passed - Recent (12 mo) or future (30 days) visit within the authorizing provider's specialty     Patient has had an office visit with the authorizing provider or a provider within the authorizing providers department within the previous 12 mos or has a future within next 30 days. See \"Patient Info\" tab in inbasket, or \"Choose Columns\" in Meds & Orders section of the refill encounter.              Passed - Medication is active on med list        Passed - Patient is age 18 or older        Passed - Normal serum creatinine on file in past 12 months     Recent Labs   Lab Test 03/02/21  0925   CR 1.19       Ok to refill medication if creatinine is low          Passed - Normal serum potassium on file in past 12 months     Recent Labs   Lab Test 03/02/21  0925   POTASSIUM 4.3                  "

## 2021-12-02 ENCOUNTER — VIRTUAL VISIT (OUTPATIENT)
Dept: FAMILY MEDICINE | Facility: CLINIC | Age: 51
End: 2021-12-02
Payer: COMMERCIAL

## 2021-12-02 DIAGNOSIS — J20.9 ACUTE BRONCHITIS WITH SYMPTOMS > 10 DAYS: Primary | ICD-10-CM

## 2021-12-02 PROCEDURE — 99213 OFFICE O/P EST LOW 20 MIN: CPT | Mod: 95 | Performed by: PHYSICIAN ASSISTANT

## 2021-12-02 RX ORDER — AZITHROMYCIN 250 MG/1
TABLET, FILM COATED ORAL
Qty: 6 TABLET | Refills: 0 | Status: SHIPPED | OUTPATIENT
Start: 2021-12-02 | End: 2021-12-07

## 2021-12-02 RX ORDER — ALBUTEROL SULFATE 90 UG/1
2 AEROSOL, METERED RESPIRATORY (INHALATION) EVERY 6 HOURS
Qty: 18 G | Refills: 0 | Status: SHIPPED | OUTPATIENT
Start: 2021-12-02 | End: 2022-01-10

## 2021-12-02 NOTE — PATIENT INSTRUCTIONS
Patient Education     Acute Bronchitis  Your healthcare provider has told you that you have acute bronchitis. Bronchitis is infection or inflammation of the airways in the lungs (bronchial tubes). Normally, air moves easily in and out of the airways. Bronchitis narrows the airways. This makes it harder for air to flow in and out of the lungs. This causes symptoms such as shortness of breath, coughing up yellow or green mucus, and wheezing.  Bronchitis can be acute or chronic. Acute means it happens quickly and goes away in a short time. Chronic means a condition lasts a long time and often comes back. Most people with acute bronchitis get better in 1 to 2 weeks.     What causes acute bronchitis?  Acute bronchitis is often caused by a virus such as a cold or the flu. In some cases, it may be caused by bacteria. Certain factors make it more likely for a cold or flu to turn into bronchitis. These include being very young, being elderly, having a heart or lung problem, or having a weak immune system. Cigarette smoking also makes bronchitis more likely.  When bronchitis develops, the airways become swollen. The airways may also become infected with bacteria. This is known as a secondary infection.  Symptoms of acute bronchitis  Symptoms can include:    Coughing with mucus    Wheezing    Feeling short of breath    Chest pain    Fever  Diagnosing acute bronchitis  Your healthcare provider will ask about your symptoms and health history. He or she will give you a physical exam. This will include listening to your lungs while you breathe. You may have a chest X-ray to look for infection in the lungs (pneumonia) if you have had a fever. You may also have blood tests to check for infection.  Treating acute bronchitis  Bronchitis usually goes away in 1 to 2 weeks without treatment. You can help feel better by:    Taking medicine as directed. Talk to your healthcare provider before taking any over-the-counter medicines (OTC).  Some OTC medicines help relieve inflammation in your bronchial tubes. They can also thin mucus. This makes it easier to cough up. Your healthcare provider may prescribe an inhaler to help open up the bronchial tubes. Most of the time, acute bronchitis is caused by a viral infection. Antibiotics are usually not prescribed for viral infections.    Drinking plenty of fluids, such as water, juice, or warm soup. Fluids loosen mucus so that you can cough it up. This helps you breathe more easily. Fluids also prevent dehydration.    Using a humidifier. This can help reduce coughing.    Getting plenty of rest    Not smoking. Also, don't let anyone else smoke in your home. In public places, move away from secondhand smoke.  Recovery and follow-up  Follow up with your doctor. You will likely feel better in 1 to 2 weeks. But you may have a dry cough for a longer time. Let your doctor know if you still have symptoms other than a dry cough after 2 weeks. Tell him or her if you get bronchial infections often.  Self-care tips  To get relief from your symptoms and prevent bronchitis:    Stop smoking. Stopping smoking is the most important step you can take to treat bronchitis. If you need help stopping smoking, talk with your healthcare provider.    Stay away from secondhand smoke and other irritants. Try to stay away from smoke, chemicals, fumes, and dust. Don t let anyone smoke in your home. Stay indoors on smoggy days.    Prevent lung infections. Ask your healthcare provider about the flu and pneumonia vaccines. Take steps to prevent colds and other lung infections.    Wash your hands well. Wash your hands often with soap and water. Use hand  when you can t wash your hands. Stay away from crowds during cold and flu season.  When to call your healthcare provider  Call the healthcare provider if you have any of these:    Fever of 100.4 F ( 38.0 C) or higher, or as directed by your healthcare provider    Symptoms that get  worse, or new symptoms    Breathing not getting better with treatments    Symptoms that don t start to get better in 1 week  [employer] last reviewed this educational content on 8/1/2019 2000-2021 [employer]. All rights reserved. This information is not intended as a substitute for professional medical care. Always follow your healthcare professional's instructions.

## 2021-12-02 NOTE — PROGRESS NOTES
"Elpidio is a 51 year old who is being evaluated via a billable telephone visit.      What phone number would you like to be contacted at? [phone]  How would you like to obtain your AVS? Mail a copy    Assessment & Plan   Acute bronchitis with symptoms > 10 days  Patient with URI symptoms several weeks ago now with a persistent unchanged cough for the last 4 week. Suspect this to be a post viral bronchitis.  Will treat with albuterol and Azithromycin.  Also recommended daily allergy medicine.  Return to clinic in 2 to 4 weeks if their symptoms are unchanged or sooner if there is any new, worsening, or concerning symptoms.  - azithromycin (ZITHROMAX) 250 MG tablet; Take 2 tablets (500 mg) by mouth daily for 1 day, THEN 1 tablet (250 mg) daily for 4 days.  - albuterol (PROAIR HFA/PROVENTIL HFA/VENTOLIN HFA) 108 (90 Base) MCG/ACT inhaler; Inhale 2 puffs into the lungs every 6 hours    BMI:   Estimated body mass index is 29.9 kg/m  as calculated from the following:    Height as of 5/3/21: 1.892 m (6' 2.5\").    Weight as of 5/3/21: 107 kg (236 lb).     Return in about 3 months (around 3/2/2022) for In-Clinic Visit.    SHANKAR Guerra [facility]    Subjective   Elpidio is a 51 year old who presents for the following health issues     HPI   Concern for COVID-19  About how many days ago did these symptoms start? About a month   Is this your first visit for this illness? Yes  In the 14 days before your symptoms started, have you had close contact with someone with COVID-19 (Coronavirus)? No  Do you have a fever or chills? Yes, I felt feverish or had chills  Are you having new or worsening difficulty breathing? No  Do you have new or worsening cough? Yes, I am coughing up mucus. But also a dry cough at times   Have you had any new or unexplained body aches? No    Have you experienced any of the following NEW symptoms?    Headache: No    Sore throat: YES and thinks it is from the coughing "     Loss of taste or smell: No    Chest pain: No    Diarrhea: No    Rash: No  What treatments have you tried? Hyun alcocer plus night time, day-quil, mucinex, ibuprofen   Who do you live with? Girlfriend   Are you, or a household member, a healthcare worker or a ? No  Do you live in a nursing home, group home, or shelter? No  Do you have a way to get food/medications if quarantined? Yes, I have a friend or family member who can help me.  Cough is the only symptom that remains.     Review of Systems   See HPI         Objective       Vitals:  No vitals were obtained today due to virtual visit.    Physical Exam   healthy, alert and no distress  PSYCH: Alert and oriented times 3; coherent speech, normal   rate and volume, able to articulate logical thoughts, able   to abstract reason, no tangential thoughts, no hallucinations   or delusions  His affect is normal  RESP: No cough, no audible wheezing, able to talk in full sentences  Remainder of exam unable to be completed due to telephone visits      Phone call duration: 8 minutes

## 2022-01-10 DIAGNOSIS — J20.9 ACUTE BRONCHITIS WITH SYMPTOMS > 10 DAYS: ICD-10-CM

## 2022-01-10 RX ORDER — ALBUTEROL SULFATE 90 UG/1
2 AEROSOL, METERED RESPIRATORY (INHALATION) EVERY 6 HOURS
Qty: 18 G | Refills: 0 | Status: SHIPPED | OUTPATIENT
Start: 2022-01-10 | End: 2022-02-08

## 2022-02-08 DIAGNOSIS — J20.9 ACUTE BRONCHITIS WITH SYMPTOMS > 10 DAYS: ICD-10-CM

## 2022-02-09 RX ORDER — ALBUTEROL SULFATE 90 UG/1
2 AEROSOL, METERED RESPIRATORY (INHALATION) EVERY 6 HOURS
Qty: 18 G | Refills: 0 | Status: SHIPPED | OUTPATIENT
Start: 2022-02-09 | End: 2022-03-07

## 2022-03-07 DIAGNOSIS — J20.9 ACUTE BRONCHITIS WITH SYMPTOMS > 10 DAYS: ICD-10-CM

## 2022-03-08 RX ORDER — ALBUTEROL SULFATE 90 UG/1
2 AEROSOL, METERED RESPIRATORY (INHALATION) EVERY 6 HOURS
Qty: 18 G | Refills: 0 | Status: SHIPPED | OUTPATIENT
Start: 2022-03-08 | End: 2022-03-09

## 2022-03-09 ENCOUNTER — TELEPHONE (OUTPATIENT)
Dept: FAMILY MEDICINE | Facility: CLINIC | Age: 52
End: 2022-03-09
Payer: COMMERCIAL

## 2022-03-09 DIAGNOSIS — J20.9 ACUTE BRONCHITIS WITH SYMPTOMS > 10 DAYS: ICD-10-CM

## 2022-03-09 RX ORDER — ALBUTEROL SULFATE 90 UG/1
2 AEROSOL, METERED RESPIRATORY (INHALATION) EVERY 6 HOURS
Qty: 54 G | Refills: 0 | Status: SHIPPED | OUTPATIENT
Start: 2022-03-09 | End: 2022-03-28

## 2022-03-09 NOTE — TELEPHONE ENCOUNTER
Pharmacy received Rx yesterday for Albuterol inhaler. Quantity ordered was 18 g.   Pharmacy asking for 90 day supply which would be 54 g

## 2022-03-28 ENCOUNTER — OFFICE VISIT (OUTPATIENT)
Dept: FAMILY MEDICINE | Facility: CLINIC | Age: 52
End: 2022-03-28
Payer: COMMERCIAL

## 2022-03-28 VITALS
HEART RATE: 62 BPM | TEMPERATURE: 97.4 F | RESPIRATION RATE: 16 BRPM | WEIGHT: 247.4 LBS | BODY MASS INDEX: 31.75 KG/M2 | OXYGEN SATURATION: 96 % | HEIGHT: 74 IN | DIASTOLIC BLOOD PRESSURE: 80 MMHG | SYSTOLIC BLOOD PRESSURE: 116 MMHG

## 2022-03-28 DIAGNOSIS — R73.03 PREDIABETES: ICD-10-CM

## 2022-03-28 DIAGNOSIS — E66.9 NON MORBID OBESITY, UNSPECIFIED OBESITY TYPE: ICD-10-CM

## 2022-03-28 DIAGNOSIS — I10 BENIGN ESSENTIAL HYPERTENSION: Primary | ICD-10-CM

## 2022-03-28 DIAGNOSIS — E78.2 MIXED HYPERLIPIDEMIA: ICD-10-CM

## 2022-03-28 LAB
ANION GAP SERPL CALCULATED.3IONS-SCNC: 4 MMOL/L (ref 3–14)
BUN SERPL-MCNC: 22 MG/DL (ref 7–30)
CALCIUM SERPL-MCNC: 8.8 MG/DL (ref 8.5–10.1)
CHLORIDE BLD-SCNC: 106 MMOL/L (ref 94–109)
CHOLEST SERPL-MCNC: 147 MG/DL
CO2 SERPL-SCNC: 25 MMOL/L (ref 20–32)
CREAT SERPL-MCNC: 0.98 MG/DL (ref 0.66–1.25)
FASTING STATUS PATIENT QL REPORTED: YES
GFR SERPL CREATININE-BSD FRML MDRD: >90 ML/MIN/1.73M2
GLUCOSE BLD-MCNC: 91 MG/DL (ref 70–99)
HBA1C MFR BLD: 5.5 % (ref 0–5.6)
HDLC SERPL-MCNC: 30 MG/DL
LDLC SERPL CALC-MCNC: 81 MG/DL
NONHDLC SERPL-MCNC: 117 MG/DL
POTASSIUM BLD-SCNC: 4.9 MMOL/L (ref 3.4–5.3)
SODIUM SERPL-SCNC: 135 MMOL/L (ref 133–144)
TRIGL SERPL-MCNC: 180 MG/DL

## 2022-03-28 PROCEDURE — 80061 LIPID PANEL: CPT | Performed by: FAMILY MEDICINE

## 2022-03-28 PROCEDURE — 80048 BASIC METABOLIC PNL TOTAL CA: CPT | Performed by: FAMILY MEDICINE

## 2022-03-28 PROCEDURE — 36415 COLL VENOUS BLD VENIPUNCTURE: CPT | Performed by: FAMILY MEDICINE

## 2022-03-28 PROCEDURE — 99214 OFFICE O/P EST MOD 30 MIN: CPT | Performed by: FAMILY MEDICINE

## 2022-03-28 PROCEDURE — 83036 HEMOGLOBIN GLYCOSYLATED A1C: CPT | Performed by: FAMILY MEDICINE

## 2022-03-28 RX ORDER — LISINOPRIL 10 MG/1
10 TABLET ORAL DAILY
Qty: 90 TABLET | Refills: 3 | Status: SHIPPED | OUTPATIENT
Start: 2022-03-28 | End: 2023-06-22

## 2022-03-28 NOTE — PATIENT INSTRUCTIONS
Be consistent with low salt, low trans fat and low saturated fat diet.  Eat food rich in omega-3-fatty acids as you tolerate. (salmon, olive oil)  Eat 5 cups of vegetables, fruits and whole grains per day.  Limit starchy food (white rice, white bread, white pasta, white potatoes) to less than a cup per meal.  Minimize sweets, junk food and fastfood. Limit soda beverages to one serving per day; best to avoid it altogether though.    Exercise: moderate intensity sustained for at least 30 mins per episode, goal of 150 mins per week at least  Combine cardiovascular and resistance exercises.  These exercise recommendations are in addition to your daily activity at work or home.  Work on losing weight.    Blood tests: You will be contacted in 1-2 days for results of your lab tests.    You have deferred vaccinations before. Reconsider getting a flu shot, Covid-19 vaccine and shingles vaccine to protect yourself and the people around you.

## 2022-03-28 NOTE — LETTER
March 29, 2022      Elpidio Swift  [address]  [place] [address]        Dear ,    We are writing to inform you of your test results.    Aside from slightly high triglyceride (bad fat) and low HDL (good ) cholesterol levels, all labs are in normal range.     Important to be consistent with low fat diet, and to manage weight. Be sure to exercise 3-4 days a week year round.   Repeat fasting cholesterol panel in a year.       Resulted Orders   HEMOGLOBIN A1C   Result Value Ref Range    Hemoglobin A1C 5.5 0.0 - 5.6 %      Comment:      Normal <5.7%   Prediabetes 5.7-6.4%    Diabetes 6.5% or higher     Note: Adopted from ADA consensus guidelines.   BASIC METABOLIC PANEL   Result Value Ref Range    Sodium 135 133 - 144 mmol/L    Potassium 4.9 3.4 - 5.3 mmol/L      Comment:      Specimen slightly hemolyzed, potassium may be falsely elevated.    Chloride 106 94 - 109 mmol/L    Carbon Dioxide (CO2) 25 20 - 32 mmol/L    Anion Gap 4 3 - 14 mmol/L    Urea Nitrogen 22 7 - 30 mg/dL    Creatinine 0.98 0.66 - 1.25 mg/dL    Calcium 8.8 8.5 - 10.1 mg/dL    Glucose 91 70 - 99 mg/dL    GFR Estimate >90 >60 mL/min/1.73m2      Comment:      Effective December 21, 2021 eGFRcr in adults is calculated using the 2021 CKD-EPI creatinine equation which includes age and gender (Karthik et al., NEJ, DOI: 10.1056/FAKLls5689006)   Lipid panel reflex to direct LDL Fasting   Result Value Ref Range    Cholesterol 147 <200 mg/dL    Triglycerides 180 (H) <150 mg/dL    Direct Measure HDL 30 (L) >=40 mg/dL    LDL Cholesterol Calculated 81 <=100 mg/dL    Non HDL Cholesterol 117 <130 mg/dL    Patient Fasting > 8hrs? Yes       Comment:      Fasting sicne 2300 on 03/28/2022  Fasting sicne 2300 on 03/28/2022  Fasting sicne 2300 on 03/28/2022  Fasting sicne 2300 on 03/28/2022    Narrative    Cholesterol  Desirable:  <200 mg/dL    Triglycerides  Normal:  Less than 150 mg/dL  Borderline High:  150-199 mg/dL  High:  200-499 mg/dL  Very High:   Greater than or equal to 500 mg/dL    Direct Measure HDL  Female:  Greater than or equal to 50 mg/dL   Male:  Greater than or equal to 40 mg/dL    LDL Cholesterol  Desirable:  <100mg/dL  Above Desirable:  100-129 mg/dL   Borderline High:  130-159 mg/dL   High:  160-189 mg/dL   Very High:  >= 190 mg/dL    Non HDL Cholesterol  Desirable:  130 mg/dL  Above Desirable:  130-159 mg/dL  Borderline High:  160-189 mg/dL  High:  190-219 mg/dL  Very High:  Greater than or equal to 220 mg/dL       If you have any questions or concerns, please call the clinic at the number listed above.       Sincerely,      Steven Lund MD

## 2022-03-28 NOTE — PROGRESS NOTES
Assessment & Plan     Benign essential hypertension  Controlled.  Low salt, low fat diet.   Exercise as tolerated.  Take meds as prescribed; call if with side effects.   Patient asked about possibility of being off meds in the future. 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   Patient was advised that is possible but will need wean down, and close monitoring in the process. Patient not ready yet to do so but wantd to just have the information.  - lisinopril (ZESTRIL) 10 MG tablet  Dispense: 90 tablet; Refill: 3  - BASIC METABOLIC PANEL  - BASIC METABOLIC PANEL    Prediabetes  Stable A1cs below 6 in the past. Asymptomatic.  Repeat A1c yearly.  - HEMOGLOBIN A1C  - BASIC METABOLIC PANEL  - HEMOGLOBIN A1C  - BASIC METABOLIC PANEL    Mixed hyperlipidemia  Reinforced heart healthy lifestyle.   - Lipid panel reflex to direct LDL Fasting  - Lipid panel reflex to direct LDL Fasting    Non morbid obesity, unspecified obesity type  Patient was advised healthy diet recommendations.  Patient was advised weekly exercise recommendations and goals.  Advised this contributes to hypertesnion as well.      Patient Instructions   Be consistent with low salt, low trans fat and low saturated fat diet.  Eat food rich in omega-3-fatty acids as you tolerate. (salmon, olive oil)  Eat 5 cups of vegetables, fruits and whole grains per day.  Limit starchy food (white rice, white bread, white pasta, white potatoes) to less than a cup per meal.  Minimize sweets, junk food and fastfood. Limit soda beverages to one serving per day; best to avoid it altogether though.    Exercise: moderate intensity sustained for at least 30 mins per episode, goal of 150 mins per week at least  Combine cardiovascular and resistance exercises.  These exercise recommendations are in addition to your daily activity at work or home.  Work on losing weight.    Blood tests: You will be contacted in 1-2 days for results of your lab  tests.    You have deferred vaccinations before. Reconsider getting a flu shot, Covid-19 vaccine and shingles vaccine to protect yourself and the people around you.      Return in about 1 year (around 3/28/2023) for In-clinic visit for blood pressure management/follow up.    Steven Lund MD  [facility] ManorBRIANA Magallanes is a 52 year old who presents for the following health issues   Chief Complaint   Patient presents with     Hypertension     Pt is fasting for lab work today.       History of Present Illness       Hypertension: He presents for follow up of hypertension.  He does not check blood pressure  regularly outside of the clinic. Outpatient blood pressures have not been over 140/90. He does not follow a low salt diet.     Reason for visit:  Blood pressure check  Symptom onset:  More than a month  Symptoms include:  Yearly checkup  Symptom intensity:  Mild  Symptom progression:  Staying the same  Had these symptoms before:  Yes  Has tried/received treatment for these symptoms:  Yes  Previous treatment was successful:  Yes  Prior treatment description:  Blood pressure pills  What makes it worse:  No  What makes it better:  No    He eats 0-1 servings of fruits and vegetables daily.He consumes 0 sweetened beverage(s) daily.He exercises with enough effort to increase his heart rate 10 to 19 minutes per day.  He is missing 7 dose(s) of medications per week.     Patient said he eats 80% less fast food than a year ago.    Patient has BP monitor at home - rarely measures it.    BP Readings from Last 3 Encounters:   03/28/22 116/80   05/03/21 127/89   03/02/21 122/84    Wt Readings from Last 3 Encounters:   03/28/22 112.2 kg (247 lb 6.4 oz)   05/03/21 107 kg (236 lb)   03/02/21 112.8 kg (248 lb 9.6 oz)                  Patient Active Problem List   Diagnosis     Hyperlipidemia     HYPERLIPIDEMIA LDL GOAL <160     Autosensitization dermatitis     ROSALINA (obstructive sleep apnea)     Benign  "essential hypertension     Prediabetes     Non morbid obesity, unspecified obesity type     Advanced directives, counseling/discussion     Past Surgical History:   Procedure Laterality Date     COLONOSCOPY N/A 5/3/2021    Procedure: COLONOSCOPY;  Surgeon: Rambo Redd MD;  Location: [facility]       Social History     Tobacco Use     Smoking status: Never Smoker     Smokeless tobacco: Never Used   Substance Use Topics     Alcohol use: Yes     Alcohol/week: 0.0 standard drinks     Comment: few per week     Family History   Problem Relation Age of Onset     Diabetes Mother 83     Unknown/Adopted Maternal Grandfather      Unknown/Adopted Paternal Grandmother      Cerebrovascular Disease Paternal Grandfather      Psychotic Disorder Brother         depression     Psychotic Disorder Daughter         ADHD     Psychotic Disorder Brother         depression and possible schizophrenia     Psychotic Disorder Brother         depression     Cardiac Sudden Death Brother          at age 57- cardiac arrest     Asthma Brother          Current Outpatient Medications   Medication Sig Dispense Refill     lisinopril (ZESTRIL) 10 MG tablet TAKE 1 TABLET(10 MG) BY MOUTH DAILY 90 tablet 2     cetirizine (ZYRTEC) 10 MG tablet Take 10 mg by mouth daily (Patient not taking: Reported on 2021)       fluticasone (FLONASE) 50 MCG/ACT nasal spray Spray 1 spray into both nostrils daily (Patient not taking: Reported on 2021)       ORDER FOR MATT Magallanes was set up on [facility]  Pressure: set 7  Mask of choice: Wisp  Size: Large cushion    Follow up appointment is scheduled: 2015       No Known Allergies  Review of Systems   Constitutional, HEENT, cardiovascular, pulmonary, GI, , musculoskeletal, neuro, skin, endocrine and psych systems are negative, except as otherwise noted.      Objective    /80   Pulse 62   Temp 97.4  F (36.3  C) (Tympanic)   Resp 16   Ht 1.886 m (6' 2.25\")   Wt 112.2 kg (247 lb 6.4 oz)   " SpO2 96%   BMI 31.55 kg/m    Body mass index is 31.55 kg/m .  Physical Exam   GENERAL: obese, alert and no distress, ambulatory w/o assist  NECK: no tenderness, no adenopathy,  Thyroid not enlarged  RESP: lungs clear to auscultation - no rales, no rhonchi, no wheezes  CV: regular rates and rhythm, no murmur  MS: no edema  SKIN: no suspicious lesions, no rashes  NEURO: strength and tone- normal, sensory exam- grossly normal, mentation- intact, speech- normal, reflexes- symmetric  ABD:  nontender    No results found for any visits on 03/28/22.

## 2022-06-10 DIAGNOSIS — I10 BENIGN ESSENTIAL HYPERTENSION: ICD-10-CM

## 2022-06-10 RX ORDER — LISINOPRIL 10 MG/1
TABLET ORAL
Qty: 90 TABLET | Refills: 3 | OUTPATIENT
Start: 2022-06-10

## 2022-06-20 ENCOUNTER — TELEPHONE (OUTPATIENT)
Dept: FAMILY MEDICINE | Facility: CLINIC | Age: 52
End: 2022-06-20
Payer: COMMERCIAL

## 2022-06-20 NOTE — TELEPHONE ENCOUNTER
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 Pt requested to have those emailed to him, [email or web address]    Phone number to reach patient:  Cell number on file:    Telephone Information:   Mobile [phone]       Best Time:  Anytime after 12    Can we leave a detailed message on this number?  YES    Travel screening: Not Applicable

## 2022-06-21 NOTE — TELEPHONE ENCOUNTER
Left message informing patient to contact [facility] for a cpap DL.

## 2022-07-11 ENCOUNTER — VIRTUAL VISIT (OUTPATIENT)
Dept: SLEEP MEDICINE | Facility: CLINIC | Age: 52
End: 2022-07-11
Payer: COMMERCIAL

## 2022-07-11 VITALS — WEIGHT: 250 LBS | HEIGHT: 74 IN | BODY MASS INDEX: 32.08 KG/M2

## 2022-07-11 DIAGNOSIS — G47.33 OSA (OBSTRUCTIVE SLEEP APNEA): Primary | ICD-10-CM

## 2022-07-11 PROCEDURE — 99203 OFFICE O/P NEW LOW 30 MIN: CPT | Mod: 95 | Performed by: PHYSICIAN ASSISTANT

## 2022-07-11 ASSESSMENT — SLEEP AND FATIGUE QUESTIONNAIRES
HOW LIKELY ARE YOU TO NOD OFF OR FALL ASLEEP IN A CAR, WHILE STOPPED FOR A FEW MINUTES IN TRAFFIC: WOULD NEVER DOZE
HOW LIKELY ARE YOU TO NOD OFF OR FALL ASLEEP WHEN YOU ARE A PASSENGER IN A CAR FOR AN HOUR WITHOUT A BREAK: WOULD NEVER DOZE
HOW LIKELY ARE YOU TO NOD OFF OR FALL ASLEEP WHILE SITTING QUIETLY AFTER LUNCH WITHOUT ALCOHOL: WOULD NEVER DOZE
HOW LIKELY ARE YOU TO NOD OFF OR FALL ASLEEP WHILE SITTING INACTIVE IN A PUBLIC PLACE: WOULD NEVER DOZE
HOW LIKELY ARE YOU TO NOD OFF OR FALL ASLEEP WHILE SITTING AND TALKING TO SOMEONE: WOULD NEVER DOZE
HOW LIKELY ARE YOU TO NOD OFF OR FALL ASLEEP WHILE WATCHING TV: WOULD NEVER DOZE
HOW LIKELY ARE YOU TO NOD OFF OR FALL ASLEEP WHILE SITTING AND READING: WOULD NEVER DOZE
HOW LIKELY ARE YOU TO NOD OFF OR FALL ASLEEP WHILE LYING DOWN TO REST IN THE AFTERNOON WHEN CIRCUMSTANCES PERMIT: WOULD NEVER DOZE

## 2022-07-11 NOTE — NURSING NOTE
Chief Complaint   Patient presents with     Video Visit     New Sleep:  Cpap      Angie Tijerina, Virtual Facilitator/LPN  ]

## 2022-07-11 NOTE — PROGRESS NOTES
Elpidio is a 52 year old who is being evaluated via a billable video visit.      How would you like to obtain your AVS? Mail a copy  If the video visit is dropped, the invitation should be resent by: Text to cell phone: [phone]  Will anyone else be joining your video visit? No    Cassandra Cabezas Facilitator/LPN        Video-Visit Details    Video Start Time: 1:31 PM    Type of service:  Video Visit    Video End Time:1:54 PM    Originating Location (pt. Location): Home    Distant Location (provider location):  [facility]     Platform used for Video Visit: [facility]       Outpatient Sleep Medicine Consultation:      Name: Elpidio Swift MRN# [id number]   Age: 52 year old YOB: 1970     Date of Consultation: July 11, 2022  Consultation is requested by: No referring provider defined for this encounter. No ref. provider found  Primary care provider: Evert Zavaleta       Reason for Sleep Consult:       Patient s Reason for visit  Elpidio ENRRIQUE Swift main reason for visit:  Re-establish care  Patient states problem(s) started:    Elpidio Swift's goals for this visit:  Needs supplies.           Assessment and Plan:     Summary Sleep Diagnoses & Recommendations:  Severe obstructive sleep apnea-  Excellent CPAP compliance and AHI is well controlled on CPAP 7 cm/H20. Daytime symptoms are improved.   Continue current treatment.   Comprehensive DME order placed.        Comorbid Diagnoses:  Hypertension  BMI of 31      Orders Placed This Encounter   Procedures     Comprehensive DME         Summary Counseling:    Old Sleep Testing Reviewed  Obstructive Sleep Apnea Reviewed  Complications of Untreated Sleep Apnea Reviewed      Medical Decision-making:   Educational materials provided in instructions    Total time spent reviewing medical records, history and physical examination, review of previous testing and interpretation as well as documentation on this date: 35  minutes           History of Present Illness:     Elpidio Swift comes in today for CPAP compliance follow-up of severe ROSALINA (2/26/2015 with AHI 85.2, xin desat 75%, CPAP 7-8 optimal) treated with CPAP 7 cm H2O. Presenting concerns of snoring, observed apnea, HTN, DOT CDL renewal.    His CPAP is subject to recall and is awaiting a replacement device from [facility].     Overall, the patient rates his experience with PAP as 7 (0 poor, 10 great). The mask is comfortable. The mask is not leaking.  He is not snoring with the mask on. He is not having gasp arousals. He is not having any oral or nasal dryness. The pressure settings are comfortable    His PAP interface is Nasal Pillows.    Bedtime is typically 2-3 AM. Usually it takes about 15 minutes to fall asleep with the mask on. Wake time is typically 10-11 PM.  Patient is using PAP therapy 7 hours per night. The patient is usually getting 7 hours of sleep per night.    He does feel rested in the morning.    Total score - [place]: 0 (7/11/2022  1:07 PM)     [facility]  CPAP 7.0 cmH2O 30 day usage data:  83% of days with > 4 hours of use. 1/30 days with no use.   Average use 390 minutes per day.   Average leak 23.27 LPM.  Average % of night in large leak 0%.    AHI 1.18 events per hour.       EPWORTH SLEEPINESS SCALE      [place] Sleepiness Scale ( KENN Espinoza  1990-1997[facility] [place]/English - Final version - 21 Nov 07 - [facility] Research [place].) 7/11/2022   Sitting and reading Would never doze   Watching TV Would never doze   Sitting, inactive in a public place (e.g. a theatre or a meeting) Would never doze   As a passenger in a car for an hour without a break Would never doze   Lying down to rest in the afternoon when circumstances permit Would never doze   Sitting and talking to someone Would never doze   Sitting quietly after a lunch without alcohol Would never doze   In a car, while stopped for a few minutes in traffic Would never doze   [place] Score (MC)  0   [place] Score (Sleep) 0       INSOMNIA SEVERITY INDEX (MED)      Insomnia Severity Index (MED) 7/11/2022   Difficulty falling asleep 0   Difficulty staying asleep 0   Problems waking up too early 0   How SATISFIED/DISSATISFIED are you with your CURRENT sleep pattern? 1   How NOTICEABLE to others do you think your sleep problem is in terms of impairing the quality of your life? 0   How WORRIED/DISTRESSED are you about your current sleep problem? 1   To what extent do you consider your sleep problem to INTERFERE with your daily functioning (e.g. daytime fatigue, mood, ability to function at work/daily chores, concentration, memory, mood, etc.) CURRENTLY? 0   MED Total Score 2       Guidelines for Scoring/Interpretation:  Total score categories:  0-7 = No clinically significant insomnia   8-14 = Subthreshold insomnia   15-21 = Clinical insomnia (moderate severity)  22-28 = Clinical insomnia (severe)  Used via courtesy of www.[place] [employer].[place].gov with permission from Ricardo Pryor PhD., [facility]      Allergies:    No Known Allergies    Medications:    Current Outpatient Medications   Medication Sig Dispense Refill     lisinopril (ZESTRIL) 10 MG tablet Take 1 tablet (10 mg) by mouth daily 90 tablet 3     ORDER FOR MATT Magallanes was set up on [facility]  Pressure: set 7  Mask of choice: Wisp  Size: Large cushion    Follow up appointment is scheduled: April 13, 2015       cetirizine (ZYRTEC) 10 MG tablet Take 10 mg by mouth daily (Patient not taking: No sig reported)       fluticasone (FLONASE) 50 MCG/ACT nasal spray Spray 1 spray into both nostrils daily (Patient not taking: No sig reported)         Problem List:  Patient Active Problem List    Diagnosis Date Noted     Advanced directives, counseling/discussion 03/02/2021     Priority: Medium     Non morbid obesity, unspecified obesity type 02/18/2019     Priority: Medium     Prediabetes 08/17/2015     Priority: Medium     Benign essential hypertension  06/23/2015     Priority: Medium     ROSALINA (obstructive sleep apnea) 03/02/2015     Priority: Medium     Severe ROSALINA (2/26/2015 with weight 242 lbs, AHI 85.2, xin desat 75%, CPAP 7-8 optimal)       Autosensitization dermatitis 09/15/2011     Priority: Medium     Patient with eruptions of vesicle lesions 07, 09, and 09/2011.  Rash responds well to triamcinolone cream.       HYPERLIPIDEMIA LDL GOAL <160 10/31/2010     Priority: Medium     Hyperlipidemia 01/23/2007     Priority: Medium     Problem list name updated by automated process. Provider to review          Past Medical/Surgical History:  No past medical history on file.  Past Surgical History:   Procedure Laterality Date     COLONOSCOPY N/A 5/3/2021    Procedure: COLONOSCOPY;  Surgeon: Rambo Redd MD;  Location: [facility]       Social History:  Social History     Socioeconomic History     Marital status: Single     Spouse name: Not on file     Number of children: Not on file     Years of education: Not on file     Highest education level: Not on file   Occupational History     Not on file   Tobacco Use     Smoking status: Never Smoker     Smokeless tobacco: Never Used   Vaping Use     Vaping Use: Never used   Substance and Sexual Activity     Alcohol use: Yes     Alcohol/week: 0.0 standard drinks     Comment: few per week     Drug use: No     Sexual activity: Yes   Other Topics Concern      Service No     Blood Transfusions No     Caffeine Concern No     Occupational Exposure No     Hobby Hazards No     Sleep Concern No     Stress Concern No     Weight Concern Yes     Comment: up slightly     Special Diet No     Back Care Yes     Comment: occ.     Exercise Yes     Comment: occ.     Bike Helmet Not Asked     Seat Belt Yes     Self-Exams No     Parent/sibling w/ CABG, MI or angioplasty before 65F 55M? No   Social History Narrative     Not on file     Social Determinants of Health     Financial Resource Strain: Not on file   Food Insecurity: Not on file  "  Transportation Needs: Not on file   Physical Activity: Not on file   Stress: Not on file   Social Connections: Not on file   Intimate Partner Violence: Not on file   Housing Stability: Not on file       Family History:  Family History   Problem Relation Age of Onset     Diabetes Mother 83     Unknown/Adopted Maternal Grandfather      Unknown/Adopted Paternal Grandmother      Cerebrovascular Disease Paternal Grandfather      Psychotic Disorder Brother         depression     Psychotic Disorder Daughter         ADHD     Psychotic Disorder Brother         depression and possible schizophrenia     Psychotic Disorder Brother         depression     Cardiac Sudden Death Brother          at age 57- cardiac arrest     Asthma Brother        Review of Systems:  A complete review of systems reviewed by me is negative with the exeption of what has been mentioned in the history of present illness.      Physical Examination:  Vitals: Ht 1.886 m (6' 2.25\")   Wt 113.4 kg (250 lb)   BMI 31.88 kg/m    BMI= Body mass index is 31.88 kg/m .             Data: All pertinent previous laboratory data reviewed     Recent Labs   Lab Test 22  1205 21  0925    137   POTASSIUM 4.9 4.3   CHLORIDE 106 106   CO2 25 28   ANIONGAP 4 3   GLC 91 102*   BUN 22 18   CR 0.98 1.19   SHANTELL 8.8 9.1         Recent Labs   Lab Test 19  1108   ALT 56         Eduardo Lester PA-C 2022         "

## 2022-07-19 NOTE — NURSING NOTE
DME orders have been automatically faxed to [facility]. 2 year appointment reminder will be sent via My Chart. Carla Hyatt CMA

## 2023-04-07 ENCOUNTER — TELEPHONE (OUTPATIENT)
Dept: SLEEP MEDICINE | Facility: CLINIC | Age: 53
End: 2023-04-07
Payer: COMMERCIAL

## 2023-04-07 NOTE — TELEPHONE ENCOUNTER
Patient Returning Call    Reason for call:  Pt advised that his cpap is on recall. Pt ?ing if he should send the cpap perscription from 2015 (if so, will need a copy of it) or do the auto adjusting - what would the Dr recommend? If the recall asks for a Dr's ph# which # should the PT then list?     Information relayed to patient:  Advised will send TE to team and they will callback PT    Patient has additional questions:  Yes    What are your questions/concerns:  See above. Pt would like to be called backed after 12PM    Who does the patient want to speak with:  it does not make a difference    Is an  needed?:  No      Okay to leave a detailed message?: Yes at Cell number on file:    Telephone Information:   Mobile [phone]

## 2023-04-07 NOTE — TELEPHONE ENCOUNTER
Spoke with patient. He will order auto titrating CPAP. Writer advised once he receives replacement to have machine registered with his supplier and then make a 3 month follow up visit with Eduardo Lester.    Sana CAO RN  [facility]

## 2023-06-22 DIAGNOSIS — E78.2 MIXED HYPERLIPIDEMIA: Primary | ICD-10-CM

## 2023-06-22 DIAGNOSIS — I10 BENIGN ESSENTIAL HYPERTENSION: ICD-10-CM

## 2023-06-22 RX ORDER — LISINOPRIL 10 MG/1
10 TABLET ORAL DAILY
Qty: 90 TABLET | Refills: 0 | Status: SHIPPED | OUTPATIENT
Start: 2023-06-22 | End: 2023-07-24

## 2023-06-22 NOTE — TELEPHONE ENCOUNTER
"Requested Prescriptions   Pending Prescriptions Disp Refills     lisinopril (ZESTRIL) 10 MG tablet [Pharmacy Med Name: LISINOPRIL 10MG TABLETS] 90 tablet 3     Sig: TAKE 1 TABLET(10 MG) BY MOUTH DAILY       ACE Inhibitors (Including Combos) Protocol Failed - 6/22/2023  3:13 AM        Failed - Blood pressure under 140/90 in past 12 months     BP Readings from Last 3 Encounters:   03/28/22 116/80   05/03/21 127/89   03/02/21 122/84                 Failed - Recent (12 mo) or future (30 days) visit within the authorizing provider's specialty     Patient has had an office visit with the authorizing provider or a provider within the authorizing providers department within the previous 12 mos or has a future within next 30 days. See \"Patient Info\" tab in inbasket, or \"Choose Columns\" in Meds & Orders section of the refill encounter.              Failed - Normal serum creatinine on file in past 12 months     Recent Labs   Lab Test 03/28/22  1205   CR 0.98       Ok to refill medication if creatinine is low          Failed - Normal serum potassium on file in past 12 months     Recent Labs   Lab Test 03/28/22  1205   POTASSIUM 4.9             Passed - Medication is active on med list        Passed - Patient is age 18 or older             "

## 2023-07-24 ENCOUNTER — OFFICE VISIT (OUTPATIENT)
Dept: FAMILY MEDICINE | Facility: CLINIC | Age: 53
End: 2023-07-24
Payer: COMMERCIAL

## 2023-07-24 VITALS
WEIGHT: 254.5 LBS | TEMPERATURE: 97.4 F | DIASTOLIC BLOOD PRESSURE: 80 MMHG | HEART RATE: 60 BPM | BODY MASS INDEX: 32.66 KG/M2 | HEIGHT: 74 IN | OXYGEN SATURATION: 95 % | SYSTOLIC BLOOD PRESSURE: 110 MMHG

## 2023-07-24 DIAGNOSIS — E78.5 HYPERLIPIDEMIA LDL GOAL <160: ICD-10-CM

## 2023-07-24 DIAGNOSIS — Z11.4 SCREENING FOR HIV (HUMAN IMMUNODEFICIENCY VIRUS): ICD-10-CM

## 2023-07-24 DIAGNOSIS — Z12.5 SCREENING FOR PROSTATE CANCER: ICD-10-CM

## 2023-07-24 DIAGNOSIS — R73.03 PREDIABETES: Primary | ICD-10-CM

## 2023-07-24 DIAGNOSIS — Z11.59 NEED FOR HEPATITIS C SCREENING TEST: ICD-10-CM

## 2023-07-24 DIAGNOSIS — L82.1 SEBORRHEIC KERATOSES: ICD-10-CM

## 2023-07-24 DIAGNOSIS — I10 BENIGN ESSENTIAL HYPERTENSION: ICD-10-CM

## 2023-07-24 LAB
ANION GAP SERPL CALCULATED.3IONS-SCNC: 11 MMOL/L (ref 7–15)
BUN SERPL-MCNC: 19.8 MG/DL (ref 6–20)
CALCIUM SERPL-MCNC: 9.8 MG/DL (ref 8.6–10)
CHLORIDE SERPL-SCNC: 104 MMOL/L (ref 98–107)
CHOLEST SERPL-MCNC: 168 MG/DL
CREAT SERPL-MCNC: 1.25 MG/DL (ref 0.67–1.17)
DEPRECATED HCO3 PLAS-SCNC: 24 MMOL/L (ref 22–29)
GFR SERPL CREATININE-BSD FRML MDRD: 68 ML/MIN/1.73M2
GLUCOSE SERPL-MCNC: 90 MG/DL (ref 70–99)
HBA1C MFR BLD: 5.5 % (ref 0–5.6)
HDLC SERPL-MCNC: 31 MG/DL
LDLC SERPL CALC-MCNC: 111 MG/DL
NONHDLC SERPL-MCNC: 137 MG/DL
POTASSIUM SERPL-SCNC: 4.8 MMOL/L (ref 3.4–5.3)
PSA SERPL DL<=0.01 NG/ML-MCNC: 0.82 NG/ML (ref 0–3.5)
SODIUM SERPL-SCNC: 139 MMOL/L (ref 136–145)
TRIGL SERPL-MCNC: 131 MG/DL

## 2023-07-24 PROCEDURE — 86803 HEPATITIS C AB TEST: CPT | Performed by: NURSE PRACTITIONER

## 2023-07-24 PROCEDURE — 87389 HIV-1 AG W/HIV-1&-2 AB AG IA: CPT | Performed by: NURSE PRACTITIONER

## 2023-07-24 PROCEDURE — 99214 OFFICE O/P EST MOD 30 MIN: CPT | Performed by: NURSE PRACTITIONER

## 2023-07-24 PROCEDURE — 80048 BASIC METABOLIC PNL TOTAL CA: CPT | Performed by: NURSE PRACTITIONER

## 2023-07-24 PROCEDURE — 36415 COLL VENOUS BLD VENIPUNCTURE: CPT | Performed by: NURSE PRACTITIONER

## 2023-07-24 PROCEDURE — 83036 HEMOGLOBIN GLYCOSYLATED A1C: CPT | Performed by: NURSE PRACTITIONER

## 2023-07-24 PROCEDURE — 80061 LIPID PANEL: CPT | Performed by: NURSE PRACTITIONER

## 2023-07-24 PROCEDURE — G0103 PSA SCREENING: HCPCS | Performed by: NURSE PRACTITIONER

## 2023-07-24 RX ORDER — LISINOPRIL 10 MG/1
10 TABLET ORAL DAILY
Qty: 90 TABLET | Refills: 3 | Status: SHIPPED | OUTPATIENT
Start: 2023-07-24 | End: 2024-07-29

## 2023-07-24 ASSESSMENT — PAIN SCALES - GENERAL: PAINLEVEL: NO PAIN (0)

## 2023-07-24 NOTE — PROGRESS NOTES
Assessment & Plan     Prediabetes  Recheck today  - HEMOGLOBIN A1C; Future    Benign essential hypertension  Well controlled.  - lisinopril (ZESTRIL) 10 MG tablet; Take 1 tablet (10 mg) by mouth daily  - Basic metabolic panel  (Ca, Cl, CO2, Creat, Gluc, K, Na, BUN); Future    Hyperlipidemia LDL goal <160  Recheck today  - Lipid panel reflex to direct LDL Non-fasting; Future    Seborrheic keratoses  Monitor for changes  Had a benign appearance today.    Screening for prostate cancer  - PSA, screen; Future      The risks, benefits and treatment options of prescribed medications or other treatments have been discussed with the patient. The patient verbalized their understanding and should call or follow up if no improvement or if they develop further problems.    AZAR Weaver CNP  [facility]            Siri Magallanes is a 53 year old, presenting for the following health issues:  Hypertension, Health Maintenance (Declined vaccines due, reminded due for physical ), and Lesion (Skin lesion left side face near nose )        7/24/2023     2:14 PM   Additional Questions   Roomed by keara ogden   Accompanied by self         7/24/2023     2:14 PM   Patient Reported Additional Medications   Patient reports taking the following new medications L Lysine -  daily x 1 week for cold sore unknown dose     History of Present Illness       Hypertension: He presents for follow up of hypertension.  He does not check blood pressure  regularly outside of the clinic. Outpatient blood pressures have not been over 140/90. He does not follow a low salt diet.     He eats 2-3 servings of fruits and vegetables daily.He consumes 1 sweetened beverage(s) daily.He exercises with enough effort to increase his heart rate 20 to 29 minutes per day.  He exercises with enough effort to increase his heart rate 3 or less days per week.   He is taking medications regularly.     BP Readings from Last 6 Encounters:  "  07/24/23 110/80   03/28/22 116/80   05/03/21 127/89   03/02/21 122/84   10/07/20 124/78   02/24/20 126/84         Skin Lesion  Onset/Duration: x 3-4 yrs   Description  Location: left side face near nose   Color: brown  Border description: raised, flat  Character: round  Itching: no  Bleeding:  No  Intensity:  mild  Progression of Symptoms:  same  Accompanying signs and symptoms:   Bleeding: No  Scaling: No  Excessive sun exposure/tanning: No  Sunscreen used: YES  History:           Any previous history of skin cancer: No  Any family history of melanoma: No  Previous episodes of similar lesion: No  Precipitating or alleviating factors: none   Therapies tried and outcome: none        Review of Systems   Constitutional, HEENT, cardiovascular, pulmonary, gi and gu systems are negative, except as otherwise noted.      Objective    /80   Pulse 60   Temp 97.4  F (36.3  C) (Tympanic)   Ht 1.88 m (6' 2\")   Wt 115.4 kg (254 lb 8 oz)   SpO2 95%   BMI 32.68 kg/m    Body mass index is 32.68 kg/m .  Physical Exam   GENERAL: healthy, alert and no distress  NECK: no adenopathy, no asymmetry, masses, or scars and thyroid normal to palpation  RESP: lungs clear to auscultation - no rales, rhonchi or wheezes  CV: regular rate and rhythm, normal S1 S2, no S3 or S4, no murmur, click or rub, no peripheral edema and peripheral pulses strong  MS: no gross musculoskeletal defects noted, no edema  SKIN: keratoses - seborrheic located near the left side of nose                      "

## 2023-07-25 LAB
HCV AB SERPL QL IA: NONREACTIVE
HIV 1+2 AB+HIV1 P24 AG SERPL QL IA: NONREACTIVE

## 2024-02-12 ENCOUNTER — APPOINTMENT (OUTPATIENT)
Dept: GENERAL RADIOLOGY | Facility: CLINIC | Age: 54
End: 2024-02-12
Attending: NURSE PRACTITIONER
Payer: COMMERCIAL

## 2024-02-12 ENCOUNTER — HOSPITAL ENCOUNTER (EMERGENCY)
Facility: CLINIC | Age: 54
Discharge: HOME | End: 2024-02-12
Attending: NURSE PRACTITIONER | Admitting: NURSE PRACTITIONER
Payer: COMMERCIAL

## 2024-02-12 VITALS
SYSTOLIC BLOOD PRESSURE: 151 MMHG | DIASTOLIC BLOOD PRESSURE: 78 MMHG | HEART RATE: 58 BPM | TEMPERATURE: 98.4 F | OXYGEN SATURATION: 96 %

## 2024-02-12 DIAGNOSIS — S60.221A CONTUSION OF RIGHT HAND, INITIAL ENCOUNTER: ICD-10-CM

## 2024-02-12 PROCEDURE — 99213 OFFICE O/P EST LOW 20 MIN: CPT | Performed by: NURSE PRACTITIONER

## 2024-02-12 PROCEDURE — 73130 X-RAY EXAM OF HAND: CPT | Mod: RT

## 2024-02-12 PROCEDURE — G0463 HOSPITAL OUTPT CLINIC VISIT: HCPCS | Performed by: NURSE PRACTITIONER

## 2024-02-12 ASSESSMENT — ACTIVITIES OF DAILY LIVING (ADL): ADLS_ACUITY_SCORE: 35
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
## 2024-02-12 NOTE — DISCHARGE INSTRUCTIONS
No fractures identified or displaced bones present x-ray images we reviewed today.  Advised use of heat to reabsorb bruising, (contusion is a thick bruise under the skin).  Recommend Tylenol or ibuprofen for pain.  Recommend follow-up at the [facility] or return here if symptoms or not improving despite recommended treatment plan.
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
## 2024-06-02 NOTE — TELEPHONE ENCOUNTER
Patient has suture removal scheduled at 1330 today. Patient had 2 sutures placed 11/11/12.     There are no instructions for the length of time for removal.     OK for removal of sutures or too soon?    Thank you,     Radha AUGUST RN    
Please assist with rescheduling.     Left message for patient to return call to clinic.   Radha Fenton RN    
Pt calling back and rescheduled for Tuesday at 2 pm.    [facility]     
Too soon for suture removal.  Needs to be closer to 10 days due to the location.  It has been only 5 days.  Evert Zavaleta MD  Family Medicine    
Yes - the patient is able to be screened

## 2024-06-12 ENCOUNTER — TELEPHONE (OUTPATIENT)
Dept: SLEEP MEDICINE | Facility: CLINIC | Age: 54
End: 2024-06-12
Payer: COMMERCIAL

## 2024-06-12 NOTE — TELEPHONE ENCOUNTER
Pt was called to schedule 2 year f/unit(s) with Eduardo Lester. LVM for them to call back    Yoanna RUFF    [place]

## 2024-06-17 PROBLEM — Z71.89 ADVANCED DIRECTIVES, COUNSELING/DISCUSSION: Status: RESOLVED | Noted: 2021-03-02 | Resolved: 2024-06-17

## 2024-06-23 NOTE — PROGRESS NOTES
CPAP Follow-Up Visit:    Date on this visit: 6/24/2024    Elpidio Swift has a follow-up visit today to review his CPAP use for ROSALINA. He was initially seen for I snore and can stop breathing at night .     [employer]pir[employer] Dreamstation 2  CPAP 7.0 cmH2O 30 day usage data:  73% of days with > 4 hours of use. 1/30 days with no use.   Average use 434 minutes per day.   Average leak 15.96 LPM.  Average % of night in large leak 0%.    AHI 1.49 events per hour.     Elpidio was last seen by my colleague, Eduardo Lester PA-C in 7/2022.     He feels things are going well with the Dreamstation 2. He could use new supplies.  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       Elpidio Swift comes in today for CPAP compliance follow-up of severe ROSALINA (2/26/2015 wt:242#, with AHI 85.2, xin desat 75%, CPAP 7-8 optimal) treated with CPAP 7 cm H2O. Presenting concerns of snoring, observed apnea, HTN, DOT CDL renewal.    Do you use a CPAP Machine at home:  yes  Overall, on a scale of 0-10 how would you rate your CPAP (0 poor, 10 great):      What type of mask do you use:  nasal pillows, Nuance Pro  Is your mask comfortable:   yes  If not, why:    How often do you replace supplies: usually about every 6 months, but it has been maybe 2 years. Soap and water wash weekly with [employer]iera. Rinses cushion more often.    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  Typical bedtime:  4 AM, midnight to 1 AM when not working  Sleep latency on PAP therapy:   <10 min  Typical wake time:  6 AM to  noon  Wakes does not wake at home but when working, he sleeps in the truck while his partner drives. He may wake when going over a bridge, but falls right back to sleep  How many hours on average per night are you using PAP therapy:    How many hours are you sleeping per night:    Do you feel well rested in the morning:      Naps: 0       Weight change since sleep study: 255 lbs. Up 13# since sleep study.      Past medical/surgical history, family history, social history, medications and allergies were reviewed.      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  Rash responds well to triamcinolone cream.      HYPERLIPIDEMIA LDL GOAL <160 10/31/2010     Priority: Medium    Hyperlipidemia 01/23/2007     Priority: Medium     Problem list name updated by automated process. Provider to review          Impression/Plan:    (G47.33) ROSALINA (obstructive sleep apnea)  (primary encounter diagnosis)  Comment: Richard is using CPAP regularly and benefits from use. He has no concerns.  Most nights, his apnea and snoring are well controlled on pressure of 7 cm. Once in a while, there are mild clusters of snoring and infrequent small cluster of apnea. His mask is old and due for replacement. He has been using [employer] to clean his supplies. 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  Plan: Comprehensive DME        Changed pressures to 7-9 cm.  A prescription was written for new supplies. We reviewed recommendations for cleaning and replacing  supplies. He was advised to not use Ciera to clean his CPAP supplies.       He will follow up with me in about 2 year(s).     28 minutes were spent on the date of the encounter doing chart review, history and exam, documentation and further activities as noted above.     Bennett Goltz, PA-C    CC: Referred Self

## 2024-06-24 ENCOUNTER — VIRTUAL VISIT (OUTPATIENT)
Dept: SLEEP MEDICINE | Facility: CLINIC | Age: 54
End: 2024-06-24
Payer: COMMERCIAL

## 2024-06-24 VITALS — WEIGHT: 255 LBS | BODY MASS INDEX: 32.73 KG/M2 | HEIGHT: 74 IN

## 2024-06-24 DIAGNOSIS — G47.33 OSA (OBSTRUCTIVE SLEEP APNEA): Primary | ICD-10-CM

## 2024-06-24 PROCEDURE — 99213 OFFICE O/P EST LOW 20 MIN: CPT | Mod: 95 | Performed by: PHYSICIAN ASSISTANT

## 2024-06-24 PROCEDURE — G2211 COMPLEX E/M VISIT ADD ON: HCPCS | Mod: 95 | Performed by: PHYSICIAN ASSISTANT

## 2024-06-24 ASSESSMENT — SLEEP AND FATIGUE QUESTIONNAIRES
HOW LIKELY ARE YOU TO NOD OFF OR FALL ASLEEP WHILE WATCHING TV: WOULD NEVER DOZE
HOW LIKELY ARE YOU TO NOD OFF OR FALL ASLEEP WHILE SITTING AND TALKING TO SOMEONE: WOULD NEVER DOZE
HOW LIKELY ARE YOU TO NOD OFF OR FALL ASLEEP WHILE SITTING INACTIVE IN A PUBLIC PLACE: WOULD NEVER DOZE
HOW LIKELY ARE YOU TO NOD OFF OR FALL ASLEEP WHILE SITTING AND READING: WOULD NEVER DOZE
HOW LIKELY ARE YOU TO NOD OFF OR FALL ASLEEP WHEN YOU ARE A PASSENGER IN A CAR FOR AN HOUR WITHOUT A BREAK: WOULD NEVER DOZE
HOW LIKELY ARE YOU TO NOD OFF OR FALL ASLEEP IN A CAR, WHILE STOPPED FOR A FEW MINUTES IN TRAFFIC: WOULD NEVER DOZE
HOW LIKELY ARE YOU TO NOD OFF OR FALL ASLEEP WHILE LYING DOWN TO REST IN THE AFTERNOON WHEN CIRCUMSTANCES PERMIT: WOULD NEVER DOZE
HOW LIKELY ARE YOU TO NOD OFF OR FALL ASLEEP WHILE SITTING QUIETLY AFTER LUNCH WITHOUT ALCOHOL: WOULD NEVER DOZE

## 2024-06-24 ASSESSMENT — PAIN SCALES - GENERAL: PAINLEVEL: NO PAIN (0)

## 2024-06-24 NOTE — PROGRESS NOTES
Virtual Visit Details    Type of service:  Video Visit   Start Time: 3:04 PM   End Time: 3:27 PM   Originating Location (pt. Location): Home    Distant Location (provider location):  Off-site  Platform used for Video Visit: Mojgan

## 2024-06-24 NOTE — NURSING NOTE
Is the patient currently in the state of [place]? YES    Visit mode:VIDEO    If the visit is dropped, the patient can be reconnected by: VIDEO VISIT: Text to cell phone:   Telephone Information:   Mobile [phone]       Will anyone else be joining the visit? NO  (If patient encounters technical issues they should call [phone]    How would you like to obtain your AVS? MyChart    Are changes needed to the allergy or medication list? No    Are refills needed on medications prescribed by this physician? NO    Reason for visit: RECHASTER PALMA

## 2024-07-29 ENCOUNTER — OFFICE VISIT (OUTPATIENT)
Dept: FAMILY MEDICINE | Facility: CLINIC | Age: 54
End: 2024-07-29
Payer: COMMERCIAL

## 2024-07-29 VITALS
HEART RATE: 66 BPM | WEIGHT: 260 LBS | OXYGEN SATURATION: 94 % | TEMPERATURE: 97.5 F | RESPIRATION RATE: 24 BRPM | DIASTOLIC BLOOD PRESSURE: 84 MMHG | SYSTOLIC BLOOD PRESSURE: 138 MMHG | HEIGHT: 75 IN | BODY MASS INDEX: 32.33 KG/M2

## 2024-07-29 DIAGNOSIS — E78.5 HYPERLIPIDEMIA LDL GOAL <160: ICD-10-CM

## 2024-07-29 DIAGNOSIS — R73.03 PREDIABETES: Primary | ICD-10-CM

## 2024-07-29 DIAGNOSIS — I10 BENIGN ESSENTIAL HYPERTENSION: ICD-10-CM

## 2024-07-29 LAB
ANION GAP SERPL CALCULATED.3IONS-SCNC: 12 MMOL/L (ref 7–15)
BUN SERPL-MCNC: 19.8 MG/DL (ref 6–20)
CALCIUM SERPL-MCNC: 9.1 MG/DL (ref 8.8–10.4)
CHLORIDE SERPL-SCNC: 104 MMOL/L (ref 98–107)
CREAT SERPL-MCNC: 1.12 MG/DL (ref 0.67–1.17)
EGFRCR SERPLBLD CKD-EPI 2021: 78 ML/MIN/1.73M2
GLUCOSE SERPL-MCNC: 103 MG/DL (ref 70–99)
HBA1C MFR BLD: 5.7 % (ref 0–5.6)
HCO3 SERPL-SCNC: 23 MMOL/L (ref 22–29)
POTASSIUM SERPL-SCNC: 4.2 MMOL/L (ref 3.4–5.3)
SODIUM SERPL-SCNC: 139 MMOL/L (ref 135–145)

## 2024-07-29 PROCEDURE — 90471 IMMUNIZATION ADMIN: CPT | Performed by: FAMILY MEDICINE

## 2024-07-29 PROCEDURE — 80048 BASIC METABOLIC PNL TOTAL CA: CPT | Performed by: FAMILY MEDICINE

## 2024-07-29 PROCEDURE — 99214 OFFICE O/P EST MOD 30 MIN: CPT | Mod: 25 | Performed by: FAMILY MEDICINE

## 2024-07-29 PROCEDURE — 36415 COLL VENOUS BLD VENIPUNCTURE: CPT | Performed by: FAMILY MEDICINE

## 2024-07-29 PROCEDURE — 90715 TDAP VACCINE 7 YRS/> IM: CPT | Performed by: FAMILY MEDICINE

## 2024-07-29 PROCEDURE — 83036 HEMOGLOBIN GLYCOSYLATED A1C: CPT | Performed by: FAMILY MEDICINE

## 2024-07-29 RX ORDER — LISINOPRIL 10 MG/1
10 TABLET ORAL DAILY
Qty: 90 TABLET | Refills: 3 | Status: SHIPPED | OUTPATIENT
Start: 2024-07-29

## 2024-07-29 RX ORDER — LISINOPRIL 10 MG/1
10 TABLET ORAL DAILY
Qty: 90 TABLET | Refills: 3 | Status: SHIPPED | OUTPATIENT
Start: 2024-07-29 | End: 2024-07-29

## 2024-07-29 ASSESSMENT — PAIN SCALES - GENERAL: PAINLEVEL: NO PAIN (0)

## 2024-07-29 NOTE — NURSING NOTE
Prior to immunization administration, verified patients identity using patient s name and date of birth. Please see Immunization Activity for additional information.     Screening Questionnaire for Adult Immunization    Are you sick today?   No   Do you have allergies to medications, food, a vaccine component or latex?   No   Have you ever had a serious reaction after receiving a vaccination?   No   Do you have a long-term health problem with heart, lung, kidney, or metabolic disease (e.g., diabetes), asthma, a blood disorder, no spleen, complement component deficiency, a cochlear implant, or a spinal fluid leak?  Are you on long-term aspirin therapy?   No   Do you have cancer, leukemia, HIV/AIDS, or any other immune system problem?   No   Do you have a parent, brother, or sister with an immune system problem?   No   In the past 3 months, have you taken medications that affect  your immune system, such as prednisone, other steroids, or anticancer drugs; drugs for the treatment of rheumatoid arthritis, Crohn s disease, or psoriasis; or have you had radiation treatments?   No   Have you had a seizure, or a brain or other nervous system problem?   No   During the past year, have you received a transfusion of blood or blood    products, or been given immune (gamma) globulin or antiviral drug?   No   For women: Are you pregnant or is there a chance you could become       pregnant during the next month?   No   Have you received any vaccinations in the past 4 weeks?   No     Immunization questionnaire answers were all negative.      Patient instructed to remain in clinic for 15 minutes afterwards, and to report any adverse reactions.     Screening performed by Sánchez Reece CMA on 7/29/2024 at 4:16 PM.

## 2024-07-29 NOTE — PROGRESS NOTES
Elpidio was seen today for hypertension and imm/inj.    Diagnoses and all orders for this visit:    Prediabetes  -     HEMOGLOBIN A1C  -     Patient will be notified of results.     Benign essential hypertension  -     BASIC METABOLIC PANEL  -     Discontinue: lisinopril (ZESTRIL) 10 MG tablet; Take 1 tablet (10 mg) by mouth daily  -     lisinopril (ZESTRIL) 10 MG tablet; Take 1 tablet (10 mg) by mouth daily        -     Medication faxed.    Hyperlipidemia LDL goal <160    Other orders  -     TDAP 10-64Y (ADACEL,BOOSTRIX)  -     REVIEW OF HEALTH MAINTENANCE PROTOCOL ORDERS         Subjective   Elpidio is a 54 year old, presenting for the following health issues:    54 yr old male here for medication refills and labs.   Patient has hypertension,pre diabetes. He is also due for a tetanus injection.  He declined the shingles vaccine.   Patient has had no issues taking his medications.  Hypertension (Renew meds , due for labs - Not fasting today ) and Imm/Inj (Tdap )      7/29/2024     4:11 PM   Additional Questions   Roomed by Sánchez CHRISTIANSON CMA   Accompanied by self     Imm/Inj    History of Present Illness       Hypertension: He presents for follow up of hypertension.  He does not check blood pressure  regularly outside of the clinic. Outpatient blood pressures have not been over 140/90. He does not follow a low salt diet.     He eats 0-1 servings of fruits and vegetables daily.He consumes 0 sweetened beverage(s) daily.He exercises with enough effort to increase his heart rate 20 to 29 minutes per day.  He exercises with enough effort to increase his heart rate 3 or less days per week.   He is taking medications regularly.                 Review of Systems  Constitutional, HEENT, cardiovascular, pulmonary, gi and gu systems are negative, except as otherwise noted.      Objective    There were no vitals taken for this visit.  There is no height or weight on file to calculate BMI.  Physical Exam   GENERAL: alert and no  distress  EYES: Eyes grossly normal to inspection, PERRL and conjunctivae and sclerae normal  HENT: ear canals and TM's normal, nose and mouth without ulcers or lesions  NECK: no adenopathy, no asymmetry, masses, or scars  RESP: lungs clear to auscultation - no rales, rhonchi or wheezes  CV: regular rate and rhythm, normal S1 S2, no S3 or S4, no murmur, click or rub, no peripheral edema  ABDOMEN: soft, nontender, no hepatosplenomegaly, no masses and bowel sounds normal  MS: no gross musculoskeletal defects noted, no edema    Results for orders placed or performed in visit on 07/29/24 (from the past 24 hour(s))   HEMOGLOBIN A1C   Result Value Ref Range    Hemoglobin A1C 5.7 (H) 0.0 - 5.6 %   BASIC METABOLIC PANEL   Result Value Ref Range    Sodium 139 135 - 145 mmol/L    Potassium 4.2 3.4 - 5.3 mmol/L    Chloride 104 98 - 107 mmol/L    Carbon Dioxide (CO2) 23 22 - 29 mmol/L    Anion Gap 12 7 - 15 mmol/L    Urea Nitrogen 19.8 6.0 - 20.0 mg/dL    Creatinine 1.12 0.67 - 1.17 mg/dL    GFR Estimate 78 >60 mL/min/1.73m2    Calcium 9.1 8.8 - 10.4 mg/dL    Glucose 103 (H) 70 - 99 mg/dL           Signed Electronically by: Ivelisse De La Torre MD

## 2024-08-12 NOTE — NURSING NOTE
Message sent to sleep center from desk to reach out to patient when it is time to schedule:  Kody Magallanes,    As part of [facility], we inform our patient's when records indicate the need for your 1 Year (DOT) Follow-Up appointment with Sleep Medicine Provider. You are due around 06/24/2025. Please call [phone] to schedule.  Your previous appointment was 06/24/2024.    Thank you!    [facility]  Carla Hyatt CMA

## 2025-03-05 ENCOUNTER — TELEPHONE (OUTPATIENT)
Dept: SLEEP MEDICINE | Facility: CLINIC | Age: 55
End: 2025-03-05
Payer: COMMERCIAL

## 2025-03-05 NOTE — TELEPHONE ENCOUNTER
Pt was called to schedule annual follow up appt with Bennett Goltz.Pt is due in June 2025. LVM with phone number to call asking them to call back.    Yoanna Lou    [facility]

## 2025-08-04 DIAGNOSIS — I10 BENIGN ESSENTIAL HYPERTENSION: ICD-10-CM

## 2025-08-04 RX ORDER — LISINOPRIL 10 MG/1
10 TABLET ORAL DAILY
Qty: 90 TABLET | Refills: 0 | Status: SHIPPED | OUTPATIENT
Start: 2025-08-04

## (undated) RX ORDER — GLYCOPYRROLATE 0.2 MG/ML
INJECTION, SOLUTION INTRAMUSCULAR; INTRAVENOUS
Status: DISPENSED
Start: 2021-05-03

## (undated) RX ORDER — PROPOFOL 10 MG/ML
INJECTION, EMULSION INTRAVENOUS
Status: DISPENSED
Start: 2021-05-03